# Patient Record
Sex: MALE | URBAN - METROPOLITAN AREA
[De-identification: names, ages, dates, MRNs, and addresses within clinical notes are randomized per-mention and may not be internally consistent; named-entity substitution may affect disease eponyms.]

---

## 2018-12-27 ENCOUNTER — OFFICE VISIT (OUTPATIENT)
Dept: ORTHOPEDIC SURGERY | Age: 28
End: 2018-12-27

## 2018-12-27 DIAGNOSIS — M25.512 LEFT SHOULDER PAIN, UNSPECIFIED CHRONICITY: Primary | ICD-10-CM

## 2018-12-27 DIAGNOSIS — M79.645 FINGER PAIN, LEFT: ICD-10-CM

## 2018-12-27 DIAGNOSIS — R10.2 PELVIC PAIN: ICD-10-CM

## 2018-12-27 PROCEDURE — 99999 PR OFFICE/OUTPT VISIT,PROCEDURE ONLY: CPT | Performed by: ORTHOPAEDIC SURGERY

## 2022-10-25 ENCOUNTER — TELEPHONE (OUTPATIENT)
Dept: ORTHOPEDICS | Facility: CLINIC | Age: 32
End: 2022-10-25
Payer: COMMERCIAL

## 2022-10-25 NOTE — TELEPHONE ENCOUNTER
Called patient to get him scheduled with Dr. Aayush Amos. Need to confirm injured body part. Left voicemail for call back.       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 He tore a ligament about a month ago, but finished out the season. He is now back in town and needs and to have his torn ligament fixed. It is being paid for through the team insurance. Can you please assist pt with scheduling if he is able to be seen?

## 2022-10-26 ENCOUNTER — TELEPHONE (OUTPATIENT)
Dept: ORTHOPEDICS | Facility: CLINIC | Age: 32
End: 2022-10-26
Payer: COMMERCIAL

## 2022-10-26 DIAGNOSIS — M25.572 LEFT ANKLE PAIN, UNSPECIFIED CHRONICITY: Primary | ICD-10-CM

## 2022-10-26 NOTE — TELEPHONE ENCOUNTER
Patient is scheduled in November with Dr. Amos for his left ankle injury while playing football.    ----- Message from Jocelynn Cohen sent at 10/26/2022 11:14 AM CDT -----  Contact: Patient  Patient needs a call back at [phone], Regards to a missed a call for an appointment access.    Thanks  Td

## 2022-11-09 ENCOUNTER — OFFICE VISIT (OUTPATIENT)
Dept: ORTHOPEDICS | Facility: CLINIC | Age: 32
End: 2022-11-09
Payer: COMMERCIAL

## 2022-11-09 ENCOUNTER — HOSPITAL ENCOUNTER (OUTPATIENT)
Dept: RADIOLOGY | Facility: HOSPITAL | Age: 32
Discharge: HOME | End: 2022-11-09
Attending: ORTHOPAEDIC SURGERY
Payer: COMMERCIAL

## 2022-11-09 VITALS — BODY MASS INDEX: 27.98 KG/M2 | HEIGHT: 75 IN | WEIGHT: 225 LBS

## 2022-11-09 DIAGNOSIS — M25.572 LEFT ANKLE PAIN, UNSPECIFIED CHRONICITY: ICD-10-CM

## 2022-11-09 DIAGNOSIS — M25.871 ANKLE IMPINGEMENT SYNDROME, RIGHT: ICD-10-CM

## 2022-11-09 DIAGNOSIS — S93.331A SUBLUXATION OF PERONEAL TENDON OF RIGHT FOOT: Primary | ICD-10-CM

## 2022-11-09 PROCEDURE — 99203 PR OFFICE/OUTPT VISIT, NEW, LEVL III, 30-44 MIN: ICD-10-PCS | Mod: S$GLB,,, | Performed by: ORTHOPAEDIC SURGERY

## 2022-11-09 PROCEDURE — 73610 X-RAY EXAM OF ANKLE: CPT | Mod: 26,RT,, | Performed by: RADIOLOGY

## 2022-11-09 PROCEDURE — 73610 X-RAY EXAM OF ANKLE: CPT | Mod: TC,RT

## 2022-11-09 PROCEDURE — 73610 XR ANKLE COMPLETE 3 VIEW RIGHT: ICD-10-PCS | Mod: 26,RT,, | Performed by: RADIOLOGY

## 2022-11-09 PROCEDURE — 99203 OFFICE O/P NEW LOW 30 MIN: CPT | Mod: S$GLB,,, | Performed by: ORTHOPAEDIC SURGERY

## 2022-11-09 PROCEDURE — 99999 PR PBB SHADOW E&M-EST. PATIENT-LVL III: ICD-10-PCS | Mod: PBBFAC,,, | Performed by: ORTHOPAEDIC SURGERY

## 2022-11-09 PROCEDURE — 99999 PR PBB SHADOW E&M-EST. PATIENT-LVL III: CPT | Mod: PBBFAC,,, | Performed by: ORTHOPAEDIC SURGERY

## 2022-11-09 NOTE — PROGRESS NOTES
Patient ID: Taras Bach  YOB: 1990  MRN: [id number]    Chief Complaint: Pain of the Right Ankle    Referred By: Teammates    History of Present Illness: Taras Bach is a  32 y.o. male - Wide Reciever with a chief complaint of Pain of the Right Ankle    Taras is here today with complaint of left ankle. He rates his pain as a 3/10 today without any pain medication. His injury occurred on 8/19/22 when he rolled his ankle outward while jumping off of it and when he went to land another player landed on his ankle while it rolled inward. He reports popping while walking. He reports swelling, weakness, slight limited ROM, but denies any numbness or tingling. He also states his ankle does not feel unstable. He has been seen prior and had an MRI that showed an older fx and torn ligaments and tendons. He did PT and a bone stim for about 3 months without much pain relief. He has not had any surgeries for his ankle, though. His pain is worsened by quickly changing direction and when he runs. His pain is alleviated by rest.      Past Medical History:   History reviewed. No pertinent past medical history.  Past Surgical History:   Procedure Laterality Date    HAND SURGERY      HERNIA REPAIR       Family History   Problem Relation Age of Onset    Cancer Father      Social History     Socioeconomic History    Marital status: Single   Tobacco Use    Smoking status: Never    Smokeless tobacco: Never   Substance and Sexual Activity    Alcohol use: Yes    Drug use: Not Currently     Types: Marijuana       Review of patient's allergies indicates:   Allergen Reactions    Amoxicillin (bulk)     Penicillins      ROS    Physical Exam:   Body mass index is 28.12 kg/m².  There were no vitals filed for this visit.   GENERAL: Well appearing, appropriate for stated age, no acute distress.  CARDIOVASCULAR: Pulses regular by peripheral palpation.  PULMONARY: Respirations are even and  "non-labored.  NEURO: Awake, alert, and oriented x 3.  PSYCH: Mood & affect are appropriate.  HEENT: Head is normocephalic and atraumatic.    Right Ankle:  Inspection: No effusion, erythema, or ecchymosis   Palpation tenderness: Tender anterior joint line, CFL, peroneal tendons     Palpation during circumduction reproduces "snapping" subluxation  Range of motion:  10 deg DF - decreased compared to contralateral ankle     50 deg PF       35 Inversion       15 Eversion  Strength:  5/5 DF    5/5 PF    5/5 Inversion    5-/5 Eversion  N/V Exam:  Tibial:    Normal sensory (plantar foot)  Normal motor (FHL)    Sup Peroneal:   Normal sensory (dorsal foot)  Normal motor (Peroneals)            Deep Peroneal:   Normal sensory (1st web space)  Normal motor (EHL)    Sural:   Normal sensory (lateral foot)   Saphenous:   Normal sensory (medial lower leg)   Normal pedal pulses, warm and well perfused with capillary refill < 2 sec   Calf soft and compressible    Imaging:    X-Ray Ankle Complete 3 View Right  Narrative: EXAMINATION:  XR ANKLE COMPLETE 3 VIEW RIGHT    CLINICAL HISTORY:  Pain in left ankle and joints of left foot    TECHNIQUE:  AP, lateral, and oblique images of the right ankle were performed.    COMPARISON:  None    FINDINGS:  Moderate osteoarthritis of the tibiotalar joint without evidence of acute fracture or dislocation.  The ankle mortise is grossly intact.  On the gravity stress view the medial clear space is slightly widened compared to the lateral clear space, however obliquity slightly limits evaluation.  Impression: Osteoarthritis of the ankle without acute fracture or dislocation.    Mild asymmetry in the clear spaces on stress view, however obliquity limits evaluation.    Electronically signed by: Alfonzo Navarrete  Date:    11/09/2022  Time:    10:04      Relevant imaging results reviewed and interpreted by me, discussed with the patient and / or family today.     Other Tests:         Patient Instructions "   Assessment:  Taras Bach is a 32 y.o. male   - Wide Reciever with a chief complaint of Pain of the Right Ankle    Right ankle injury August 2022    Encounter Diagnoses   Name Primary?    Subluxation of peroneal tendon of right foot Yes    Ankle impingement syndrome, right       Plan:  The patient's history, clinical exam, and imaging findings are consistent with peroneal tendon injury with subluxation and ankle joint impingement.  We require a new MRI to evaluate this more closely and guide surgery decisions.  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    Follow-up: after MRI or sooner if there are any problems between now and then.    Leave Review:   Google: Leave Google Review  Healthgrades: Leave Healthgrades Review    After Hours Number: [phone]      Provider Note/Medical Decision Making:       I discussed worrisome and red flag signs and symptoms with the patient. The patient expressed understanding and agreed to alert me immediately or to go to the emergency room if they experience any of these.   Treatment plan was developed with input from the patient/family, and they expressed understanding and agreement with the plan. All questions were answered today.          Aayush Amos MD  Orthopaedic Surgery & Sports Medicine       Disclaimer: This note was prepared using a voice recognition system and is likely to have sound alike errors within the text.

## 2022-11-09 NOTE — PATIENT INSTRUCTIONS
Assessment:  Taras Bach is a 32 y.o. male   - Wide Reciever with a chief complaint of Pain of the Right Ankle    Right ankle injury August 2022    Encounter Diagnoses   Name Primary?    Subluxation of peroneal tendon of right foot Yes    Ankle impingement syndrome, right       Plan:  The patient's history, clinical exam, and imaging findings are consistent with peroneal tendon injury with subluxation and ankle joint impingement.  We require a new MRI to evaluate this more closely and guide surgery decisions.  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    Follow-up: after MRI or sooner if there are any problems between now and then.    Leave Review:   Google: Leave Google Review  Healthgrades: Leave Healthgrades Review    After Hours Number: [phone]

## 2022-11-09 NOTE — PROGRESS NOTES
Patient ID: Taras Bach  YOB: 1990  MRN: [id number]    Chief Complaint: Pain, Injury, and Swelling of the Left Ankle      Referred By: Teammates    History of Present Illness: Taras Bach is a  32 y.o. male   - Wide Reciever with a chief complaint of Pain, Injury, and Swelling of the Left Ankle    Taras is here today with complaint of left ankle. He rates his pain as a 3/10 today without any pain medication. His injury occurred on 8/19/22 when he rolled his ankle outward while jumping off of it and when he went to land another player landed on his ankle while it rolled inward. He reports popping while walking. He reports swelling, weakness, slight limited ROM, but denies any numbness or tingling. He also states his ankle does not feel unstable. He has been seen prior and had an MRI that showed an older fx and torn ligaments and tendons. He did PT and a bone stim for about 3 months without much pain relief. He has not had any surgeries for his ankle, though. His pain is worsened by quickly changing direction and when he runs. His pain is alleviated by rest.    HPI    Past Medical History:   History reviewed. No pertinent past medical history.  Past Surgical History:   Procedure Laterality Date    HAND SURGERY      HERNIA REPAIR       Family History   Problem Relation Age of Onset    Cancer Father      Social History     Socioeconomic History    Marital status: Single   Tobacco Use    Smoking status: Never    Smokeless tobacco: Never   Substance and Sexual Activity    Alcohol use: Yes    Drug use: Not Currently     Types: Marijuana       Review of patient's allergies indicates:   Allergen Reactions    Amoxicillin (bulk)     Penicillins      ROS    Physical Exam:   Body mass index is 28.12 kg/m².  There were no vitals filed for this visit.   GENERAL: Well appearing, appropriate for stated age, no acute distress.  CARDIOVASCULAR: Pulses regular by peripheral  palpation.  PULMONARY: Respirations are even and non-labored.  NEURO: Awake, alert, and oriented x 3.  PSYCH: Mood & affect are appropriate.  HEENT: Head is normocephalic and atraumatic.  Ortho/SPM Exam  ***    Imaging:    X-Ray Ankle Complete 3 View Right  Narrative: EXAMINATION:  XR ANKLE COMPLETE 3 VIEW RIGHT    CLINICAL HISTORY:  Pain in left ankle and joints of left foot    TECHNIQUE:  AP, lateral, and oblique images of the right ankle were performed.    COMPARISON:  None    FINDINGS:  Moderate osteoarthritis of the tibiotalar joint without evidence of acute fracture or dislocation.  The ankle mortise is grossly intact.  On the gravity stress view the medial clear space is slightly widened compared to the lateral clear space, however obliquity slightly limits evaluation.  Impression: Osteoarthritis of the ankle without acute fracture or dislocation.    Mild asymmetry in the clear spaces on stress view, however obliquity limits evaluation.    Electronically signed by: Alfonzo Navarrete  Date:    11/09/2022  Time:    10:04    ***  Relevant imaging results reviewed and interpreted by me, discussed with the patient and / or family today. ***    Other Tests:     ***    There are no Patient Instructions on file for this visit.  Provider Note/Medical Decision Making: ***      I discussed worrisome and red flag signs and symptoms with the patient. The patient expressed understanding and agreed to alert me immediately or to go to the emergency room if they experience any of these.   Treatment plan was developed with input from the patient/family, and they expressed understanding and agreement with the plan. All questions were answered today.          Aayush Amos MD  Orthopaedic Surgery & Sports Medicine       Disclaimer: This note was prepared using a voice recognition system and is likely to have sound alike errors within the text.

## 2022-11-17 ENCOUNTER — TELEPHONE (OUTPATIENT)
Dept: ORTHOPEDICS | Facility: CLINIC | Age: 32
End: 2022-11-17
Payer: COMMERCIAL

## 2022-11-17 NOTE — TELEPHONE ENCOUNTER
Called pt to inform him his MRI is denied due to insurance not having MRIs as a covered benefit. Suggested he speak with his insurance connections through the [employer] and see if there was an error of if they need anything from us to approve it.   He stated that he was told by his [place] doctor that the MRI was clear and easy to see he needed surgery, to which I explained the copy we were provided was entirely blurry and unreadable. He stated he could send a new copy and I advised that it could take weeks to receive from caitlyn.   He stated he will go forward with the MRI whether insurance covers it or not and is willing to pay out of pocket because he just wants to get his surgery scheduled as soon as possible to return to play sooner.   I provided estimates and pricing's phone number and told him to reach out if he has any questions

## 2022-11-18 ENCOUNTER — HOSPITAL ENCOUNTER (OUTPATIENT)
Dept: RADIOLOGY | Facility: HOSPITAL | Age: 32
Discharge: HOME | End: 2022-11-18
Attending: ORTHOPAEDIC SURGERY
Payer: COMMERCIAL

## 2022-11-18 DIAGNOSIS — S93.331A SUBLUXATION OF PERONEAL TENDON OF RIGHT FOOT: ICD-10-CM

## 2022-11-18 DIAGNOSIS — M25.871 ANKLE IMPINGEMENT SYNDROME, RIGHT: ICD-10-CM

## 2022-11-18 PROCEDURE — 73721 MRI JNT OF LWR EXTRE W/O DYE: CPT | Mod: TC,PN,RT

## 2022-11-28 ENCOUNTER — HOSPITAL ENCOUNTER (OUTPATIENT)
Dept: RADIOLOGY | Facility: HOSPITAL | Age: 32
Discharge: HOME | End: 2022-11-28
Attending: ORTHOPAEDIC SURGERY
Payer: COMMERCIAL

## 2022-11-28 ENCOUNTER — OFFICE VISIT (OUTPATIENT)
Dept: ORTHOPEDICS | Facility: CLINIC | Age: 32
End: 2022-11-28
Payer: COMMERCIAL

## 2022-11-28 VITALS — WEIGHT: 225 LBS | HEIGHT: 75 IN | BODY MASS INDEX: 27.98 KG/M2

## 2022-11-28 DIAGNOSIS — S93.331A SUBLUXATION OF PERONEAL TENDON OF RIGHT FOOT: Primary | ICD-10-CM

## 2022-11-28 DIAGNOSIS — S93.331A SUBLUXATION OF PERONEAL TENDON OF RIGHT FOOT: ICD-10-CM

## 2022-11-28 DIAGNOSIS — S93.431S SYNDESMOTIC DISRUPTION OF RIGHT ANKLE, SEQUELA: ICD-10-CM

## 2022-11-28 DIAGNOSIS — M25.871 ANKLE IMPINGEMENT SYNDROME, RIGHT: ICD-10-CM

## 2022-11-28 DIAGNOSIS — S93.421S TEAR OF DELTOID LIGAMENT OF RIGHT ANKLE, SEQUELA: ICD-10-CM

## 2022-11-28 PROCEDURE — 73610 X-RAY EXAM OF ANKLE: CPT | Mod: 26,RT,, | Performed by: RADIOLOGY

## 2022-11-28 PROCEDURE — 77071 PR  X-RAY STRESS VIEW: ICD-10-PCS | Mod: RT,S$GLB,, | Performed by: ORTHOPAEDIC SURGERY

## 2022-11-28 PROCEDURE — 73610 X-RAY EXAM OF ANKLE: CPT | Mod: TC,RT

## 2022-11-28 PROCEDURE — 73610 XR ANKLE COMPLETE 3 VIEW RIGHT: ICD-10-PCS | Mod: 26,RT,, | Performed by: RADIOLOGY

## 2022-11-28 PROCEDURE — 99214 PR OFFICE/OUTPT VISIT, EST, LEVL IV, 30-39 MIN: ICD-10-PCS | Mod: S$GLB,,, | Performed by: ORTHOPAEDIC SURGERY

## 2022-11-28 PROCEDURE — 99214 OFFICE O/P EST MOD 30 MIN: CPT | Mod: S$GLB,,, | Performed by: ORTHOPAEDIC SURGERY

## 2022-11-28 PROCEDURE — 99999 PR PBB SHADOW E&M-EST. PATIENT-LVL III: CPT | Mod: PBBFAC,,, | Performed by: ORTHOPAEDIC SURGERY

## 2022-11-28 PROCEDURE — 77071 MNL APPL STRS JT RADIOGRAPHY: CPT | Mod: RT,S$GLB,, | Performed by: ORTHOPAEDIC SURGERY

## 2022-11-28 PROCEDURE — 99999 PR PBB SHADOW E&M-EST. PATIENT-LVL III: ICD-10-PCS | Mod: PBBFAC,,, | Performed by: ORTHOPAEDIC SURGERY

## 2022-11-28 NOTE — PROGRESS NOTES
Patient ID: Taras Bach  YOB: 1990  MRN: [id number]    Chief Complaint: Pain and Injury of the Right Ankle      Referred By: Teammates    History of Present Illness: Taras Bach is a 32 y.o. male   - Wide Reciever with a chief complaint of Pain and Injury of the Right Ankle    Patient presents to the clinic today to review the results of his Right Ankle MRI. He c/o 6/10 pain on the Medial and Lateral aspect of his Ankle. Recall that his injury occurred on 8/19/22 when he rolled his ankle outward while jumping off of it and when he went to land another player landed on his ankle while it rolled inward. He c/o pain with weight bearing and prolonged standing. He states that he is out of sports and is not currently in PT. He ambulates FWB w/o assistance.     Recall from his previous visit on 11/9/2022:  Taras is here today with complaint of left ankle. He rates his pain as a 3/10 today without any pain medication. His injury occurred on 8/19/22 when he rolled his ankle outward while jumping off of it and when he went to land another player landed on his ankle while it rolled inward. He reports popping while walking. He reports swelling, weakness, slight limited ROM, but denies any numbness or tingling. He also states his ankle does not feel unstable. He has been seen prior and had an MRI that showed an older fx and torn ligaments and tendons. He did PT and a bone stim for about 3 months without much pain relief. He has not had any surgeries for his ankle, though. His pain is worsened by quickly changing direction and when he runs. His pain is alleviated by rest.    HPI    Past Medical History:   No past medical history on file.  Past Surgical History:   Procedure Laterality Date    HAND SURGERY      HERNIA REPAIR       Family History   Problem Relation Age of Onset    Cancer Father      Social History     Socioeconomic History    Marital status: Single   Tobacco  Use    Smoking status: Never    Smokeless tobacco: Never   Substance and Sexual Activity    Alcohol use: Yes    Drug use: Not Currently     Types: Marijuana       Review of patient's allergies indicates:   Allergen Reactions    Amoxicillin (bulk)     Penicillins      ROS    Physical Exam:   There is no height or weight on file to calculate BMI.  There were no vitals filed for this visit.   GENERAL: Well appearing, appropriate for stated age, no acute distress.  CARDIOVASCULAR: Pulses regular by peripheral palpation.  PULMONARY: Respirations are even and non-labored.  NEURO: Awake, alert, and oriented x 3.  PSYCH: Mood & affect are appropriate.  HEENT: Head is normocephalic and atraumatic.  Ortho/SPM Exam  ***    Imaging:    MRI Ankle Without Contrast Right  Narrative: EXAMINATION:  MRI ANKLE WITHOUT CONTRAST RIGHT    CLINICAL HISTORY:  Ankle trauma, continued pain > 1wk, neg xray (Age >= 5y);  Other subluxation of right foot, initial encounter    TECHNIQUE:  Multiplanar/multisequence MRI of the right ankle was performed without the use of intravenous or intra-articular gadolinium.    COMPARISON:  Right ankle radiograph on 11/09/2022    FINDINGS:  Ligaments: Complete ATFL and deltoid ligament tears.  Fluid and multiple tiny ossicles are demonstrated in anterolateral recess.  There is a small marginal osteophyte at the anterior tibial plafond and.  High-grade partial-thickness anterior inferior tibiotalar ligament tear with fluid signal at the inferior tibiofibular syndesmosis.  The posteroinferior tibiofibular ligament appears intact.    Indistinct irregular appearance of the PTFL and attenuation of the calcaneofibular ligament consistent with grade 2 sprains.    Tendons: The medial flexor tendons are within normal limits.  There is fluid seen in the flexor hallucis longus tendon sheath.  Longitudinal split tear through the peroneal longus tendon at the level of the lateral malleolus.  There is a concave contour of  the posterior aspect of lateral malleolus.  No evidence of peroneal subluxation.  Fluid in the peroneal tendon sheath.  The peroneal brevis tendon is intact.    Fluid in the extensor digitorum tendon sheath consistent with tenosynovitis.  No evidence of extensor tendon tear.    Joints: Small tibiotalar joint effusion.  The talar dome is within normal limits.  No evidence of tibiotalar chondral defect.    Bones:  Patchy marrow edema in the medial malleolus.  No cortical fracture lines.  There is a prominent bony protuberance arising from the anterior process of the calcaneus projecting into the sinus tarsi with there is an 8 mm ossicle present.  These findings may be sequela of previous fracture.    Miscellaneous: The plantar fascia and tarsal tunnel are within normal limits.  Prominent os trigonum surrounded by fluid.  No marrow edema identified within the accessory ossicle.  Impression: Complete ATFL and deltoid ligament tears.    High-grade partial-thickness anterior inferior tibiofibular ligament tear with probable tear of the inferior tibiofibular syndesmosis.    Grade 2 PTFL and calcaneofibular ligament sprains.    Longitudinally split tear through the peroneal longus tendon with associated tenosynovitis.    Extensor digitorum tenosynovitis.    Electronically signed by: Sudhir Jason MD  Date:    11/18/2022  Time:    14:02    ***  Relevant imaging results reviewed and interpreted by me, discussed with the patient and / or family today. ***    Other Tests:     ***    There are no Patient Instructions on file for this visit.  Provider Note/Medical Decision Making: ***      I discussed worrisome and red flag signs and symptoms with the patient. The patient expressed understanding and agreed to alert me immediately or to go to the emergency room if they experience any of these.   Treatment plan was developed with input from the patient/family, and they expressed understanding and agreement with the plan. All  questions were answered today.          Aayush Amos MD  Orthopaedic Surgery & Sports Medicine       Disclaimer: This note was prepared using a voice recognition system and is likely to have sound alike errors within the text.

## 2022-11-28 NOTE — PROGRESS NOTES
Patient ID: Taras Bach  YOB: 1990  MRN: [id number]    Chief Complaint: Pain and Injury of the Right Ankle      Referred By: Teammates    History of Present Illness: Taras Bach is a 32 y.o. male   - Wide Reciever with a chief complaint of Pain and Injury of the Right Ankle    Patient presents to the clinic today to review the results of his Right Ankle MRI. He c/o 6/10 pain on the Medial and Lateral aspect of his Ankle. Recall that his injury occurred on 8/19/22 when he rolled his ankle outward while jumping off of it and when he went to land another player landed on his ankle while it rolled inward. He c/o pain with weight bearing and prolonged standing. He states that he is out of sports and is not currently in PT. He ambulates FWB w/o assistance.     Recall from his previous visit on 11/9/2022:  Taras is here today with complaint of left ankle. He rates his pain as a 3/10 today without any pain medication. His injury occurred on 8/19/22 when he rolled his ankle outward while jumping off of it and when he went to land another player landed on his ankle while it rolled inward. He reports popping while walking. He reports swelling, weakness, slight limited ROM, but denies any numbness or tingling. He also states his ankle does not feel unstable. He has been seen prior and had an MRI that showed an older fx and torn ligaments and tendons. He did PT and a bone stim for about 3 months without much pain relief. He has not had any surgeries for his ankle, though. His pain is worsened by quickly changing direction and when he runs. His pain is alleviated by rest.    HPI    Past Medical History:   History reviewed. No pertinent past medical history.  Past Surgical History:   Procedure Laterality Date    HAND SURGERY      HERNIA REPAIR       Family History   Problem Relation Age of Onset    Cancer Father      Social History     Socioeconomic History    Marital  status: Single   Tobacco Use    Smoking status: Never    Smokeless tobacco: Never   Substance and Sexual Activity    Alcohol use: Yes    Drug use: Not Currently     Types: Marijuana       Review of patient's allergies indicates:   Allergen Reactions    Amoxicillin (bulk)     Penicillins      ROS    Physical Exam:   Body mass index is 28.12 kg/m².  There were no vitals filed for this visit.   GENERAL: Well appearing, appropriate for stated age, no acute distress.  CARDIOVASCULAR: Pulses regular by peripheral palpation.  PULMONARY: Respirations are even and non-labored.  NEURO: Awake, alert, and oriented x 3.  PSYCH: Mood & affect are appropriate.  HEENT: Head is normocephalic and atraumatic.  Ortho/SPM Exam  Right ankle  Ttp peroneals w subluxation/popping  Swelling anterolateral  Pain anterolaterally and medial with palp  Intact EHL, FHL, gastrocsoleus, and tibialis anterior. Sensation intact to light touch in superficial peroneal, deep peroneal, tibial, sural, and saphenous nerve distributions. Foot warm and well perfused with capillary refill of less than 2 seconds and palpable pedal pulses.      Imaging:    X-Ray Ankle Complete 3 View Right  Narrative: EXAMINATION:  XR ANKLE COMPLETE 3 VIEW RIGHT    CLINICAL HISTORY:  Other subluxation of right foot, initial encounter    TECHNIQUE:  AP, lateral, and oblique images of the right ankle were performed.    COMPARISON:  11/18/2022    FINDINGS:  No fracture dislocation.  The alignment and joint spaces are normal.  No significant soft tissue swelling.  Impression: No fracture or dislocation.    Electronically signed by: Christian Borjas MD  Date:    11/28/2022  Time:    11:53    MRI Ankle Without Contrast Right  Order: [id number]  Status: Final result     Visible to patient: No (seen, inaccessible in Patient Portal)     Next appt: None     Dx: Ankle impingement syndrome, right; Evans...     0 Result Notes  Details    Reading Physician Reading Date Result Priority   Sudhirwade Jason  MD  [phone] 11/18/2022 Routine     Narrative & Impression  EXAMINATION:  MRI ANKLE WITHOUT CONTRAST RIGHT     CLINICAL HISTORY:  Ankle trauma, continued pain > 1wk, neg xray (Age >= 5y);  Other subluxation of right foot, initial encounter     TECHNIQUE:  Multiplanar/multisequence MRI of the right ankle was performed without the use of intravenous or intra-articular gadolinium.     COMPARISON:  Right ankle radiograph on 11/09/2022     FINDINGS:  Ligaments: Complete ATFL and deltoid ligament tears.  Fluid and multiple tiny ossicles are demonstrated in anterolateral recess.  There is a small marginal osteophyte at the anterior tibial plafond and.  High-grade partial-thickness anterior inferior tibiotalar ligament tear with fluid signal at the inferior tibiofibular syndesmosis.  The posteroinferior tibiofibular ligament appears intact.     Indistinct irregular appearance of the PTFL and attenuation of the calcaneofibular ligament consistent with grade 2 sprains.     Tendons: The medial flexor tendons are within normal limits.  There is fluid seen in the flexor hallucis longus tendon sheath.  Longitudinal split tear through the peroneal longus tendon at the level of the lateral malleolus.  There is a concave contour of the posterior aspect of lateral malleolus.  No evidence of peroneal subluxation.  Fluid in the peroneal tendon sheath.  The peroneal brevis tendon is intact.     Fluid in the extensor digitorum tendon sheath consistent with tenosynovitis.  No evidence of extensor tendon tear.     Joints: Small tibiotalar joint effusion.  The talar dome is within normal limits.  No evidence of tibiotalar chondral defect.     Bones:  Patchy marrow edema in the medial malleolus.  No cortical fracture lines.  There is a prominent bony protuberance arising from the anterior process of the calcaneus projecting into the sinus tarsi with there is an 8 mm ossicle present.  These findings may be sequela of previous fracture.      Miscellaneous: The plantar fascia and tarsal tunnel are within normal limits.  Prominent os trigonum surrounded by fluid.  No marrow edema identified within the accessory ossicle.     Impression:     Complete ATFL and deltoid ligament tears.     High-grade partial-thickness anterior inferior tibiofibular ligament tear with probable tear of the inferior tibiofibular syndesmosis.     Grade 2 PTFL and calcaneofibular ligament sprains.     Longitudinally split tear through the peroneal longus tendon with associated tenosynovitis.     Extensor digitorum tenosynovitis.        Electronically signed by: Sudhri Jason MD  Date:                                            11/18/2022  Time:                                           14:02     Relevant imaging results reviewed and interpreted by me, discussed with the patient and / or family today. I personally performed a manual stress examination an x-ray today.  I used lead gloves and a lead apron and we obtained a regular mortise view and then mortise view with lateral external rotation stress.    Other Tests:         Patient Instructions   Assessment:  Taras Bach is a 32 y.o. male   - Wide Reciever with a chief complaint of Pain and Injury of the Right Ankle    Right ankle injury August 2022    Encounter Diagnoses   Name Primary?    Subluxation of peroneal tendon of right foot Yes    Ankle impingement syndrome, right     Syndesmotic disruption of right ankle, sequela     Tear of deltoid ligament of right ankle, sequela       Plan:  Referral to Dr. Ruma Rodriguez  at [facility] for 2nd opinion and surgical consult, possible joint surgery  We have discussed his football season and timeline. He would like to return to football by next April-June 2023.    Follow-up: TBD after consult with Dr. Rodriguez if there are any problems between now and then.    Leave Review:   Google: Leave Google Review  Healthgrades: Leave Healthgrades Review    After Hours  Number: [phone]      Provider Note/Medical Decision Making:  I personally performed a manual stress examination an x-ray today.  I used lead gloves and a lead apron and we obtained a regular mortise view and then mortise view with lateral external rotation stress.  He had this injury in September.  He continues to have significant functional limitations.  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  He is mostly symptomatic laterally and posterolaterally.  He did not open up significantly with a manual stress today but he does have evidence of syndesmosis injury on MRI and deltoid injury as well.  He has been consistently symptomatic.  We are going to refer him to Dr. Ruma Grande who is a foot and ankle and sports medicine specialist for collaboration on the best course of action for this patient      I discussed worrisome and red flag signs and symptoms with the patient. The patient expressed understanding and agreed to alert me immediately or to go to the emergency room if they experience any of these.   Treatment plan was developed with input from the patient/family, and they expressed understanding and agreement with the plan. All questions were answered today.          Aayush Amos MD  Orthopaedic Surgery & Sports Medicine       Disclaimer: This note was prepared using a voice recognition system and is likely to have sound alike errors within the text.

## 2022-11-28 NOTE — PATIENT INSTRUCTIONS
Assessment:  Taras Bach is a 32 y.o. male   - Wide Reciever with a chief complaint of Pain and Injury of the Right Ankle    Right ankle injury August 2022    Encounter Diagnoses   Name Primary?    Subluxation of peroneal tendon of right foot Yes    Ankle impingement syndrome, right     Syndesmotic disruption of right ankle, sequela     Tear of deltoid ligament of right ankle, sequela       Plan:  Referral to Dr. Ruma Rodriguez  at [facility] for 2nd opinion and surgical consult, possible joint surgery  We have discussed his football season and timeline. He would like to return to football by next April-June 2023.    Follow-up: TBD after consult with Dr. Rodriguez if there are any problems between now and then.    Leave Review:   Google: Leave Google Review  Healthgrades: Leave Healthgrades Review    After Hours Number: [phone]

## 2022-12-02 ENCOUNTER — TELEPHONE (OUTPATIENT)
Dept: PREADMISSION TESTING | Facility: HOSPITAL | Age: 32
End: 2022-12-02
Payer: COMMERCIAL

## 2022-12-02 DIAGNOSIS — Z01.818 PRE-OP TESTING: Primary | ICD-10-CM

## 2022-12-02 NOTE — TELEPHONE ENCOUNTER
Pre op instructions reviewed with patient per phone.      To confirm, your doctor has instructed you: Surgery is scheduled for 12/6/2022.     Pre admit office will call the afternoon prior to surgery between 1PM and 3PM with arrival time.    Surgery will be at [facility],  The address is [address] [address]. [place]  [id number].      IMPORTANT INSTRUCTIONS!    Do not eat or drink after 12 midnight, including water. Do not smoke or use chewing tobacco after 12 midnight  OK to brush teeth, but no gum, candy, or mints!      *Take only these medicines with a small swallow of water-morning of surgery*     none       ____ Stop Aspirin, Ibuprofen, Motrin and Aleve at least 5-7 days before surgery, unless otherwise instructed by your doctor, or the nurse.   You MAY use Tylenol/acetaminophen until day of surgery.      ____  If you take diabetic medication, do NOT take morning of surgery unless instructed by Doctor. Metformin must be stopped 24 hrs prior to surgery time.       ____ Stop taking any Fish Oil supplements or Vitamins at least 5 days prior to surgery, unless instructed otherwise by your Doctor.     Bathing Instructions: The night before surgery and the morning prior to coming to the hospital:    - Shower & rinse your body as usual with anti-bacterial Soap (Dial or Lever 2000)   -Hibiclens (if indicated) use AFTER anti-bacterial soap; 1 packet PM/1 packet in AM on surgical site only   -Do not use hibiclens on your head, face, or genitals.    -Do not wash with anti-bacterial soap after you use the hibiclens.    -Do not shave surgical site 5-7 days prior to surgery.    -Pubic hair 7 days prior to surgery (gyn pt's).      Pediatric patients do not need to use anti-bacterial soap or Hibiclens.             After Bathing:   __ No powder, lotions, creams, or body spray to skin     __No deodorant for any breast procedure, PORT, or upper arm surgery     __ No makeup, mascara, nail polish or artificial nails        **SURGERY WILL BE CANCELLED IF ARTIFICIAL/NAIL POLISH IS PRESENT!!!**    __ Please remove all piercings and leave all jewelry at home.    **SURGERY WILL BE CANCELLED IF PIERCINGS ARE PRESENT!!!**      __ Dentures, Hearing Aids and Contact Lens need to be removed prior to the start of surgery.      __ Wear clean, loose-fitting clothing. Allow for dressings/bandages/surgical equipment     __ You must have transportation, and they MUST stay the entire time.         [facility] Visitor/Ride Policy:   Only 1 adult allowed (over the age of 18) to accompany you into Pre-op/Recovery Surgery Dept and must stay through the entire length of admission.     Must have a ride home from a responsible adult that you know and trust.      Pediatric Patients are allowed 2 adult visitors.     Medical Transport, Uber or Lyft can only be used if patient has a responsible adult to accompany them during ride home.           Post-Op Instructions: You will receive surgery post-op instructions by your Discharge Nurse prior to going home.     Surgical Site Infection:   Prevention of surgical site infections:   -Keep incisions clean and dry.   -Do not soak/submerge incisions in water until completely healed.   -Do not apply lotions, powders, creams, or deodorants to site.   -Always make sure hands are cleaned with antibacterial soap/ alcohol-based   prior to touching the surgical site.       Signs and symptoms:               -Redness and pain around the area where you had surgery               -Drainage of cloudy fluid from your surgical wound               -Fever over 100.4 or chills     >>>Call Surgeon office/on-call Surgeon if you experience any of these signs & symptoms post-surgery.        *Please Call [facility] Pre-Admissions Department with surgery instruction questions at [phone].     *Insurance Questions, please call [phone] or [phone]    Spoke about pre op process and surgery instructions, verbalized understanding.

## 2022-12-05 ENCOUNTER — TELEPHONE (OUTPATIENT)
Dept: ORTHOPEDICS | Facility: CLINIC | Age: 32
End: 2022-12-05
Payer: COMMERCIAL

## 2022-12-05 ENCOUNTER — ANESTHESIA EVENT (OUTPATIENT)
Dept: SURGERY | Facility: HOSPITAL | Age: 32
End: 2022-12-05
Payer: COMMERCIAL

## 2022-12-05 ENCOUNTER — LAB VISIT (OUTPATIENT)
Dept: LAB | Facility: HOSPITAL | Age: 32
End: 2022-12-05
Payer: COMMERCIAL

## 2022-12-05 DIAGNOSIS — Z01.818 PRE-OP TESTING: ICD-10-CM

## 2022-12-05 LAB
ALBUMIN SERPL BCP-MCNC: 4.1 G/DL (ref 3.5–5.2)
ALP SERPL-CCNC: 101 U/L (ref 55–135)
ALT SERPL W/O P-5'-P-CCNC: 22 U/L (ref 10–44)
ANION GAP SERPL CALC-SCNC: 11 MMOL/L (ref 8–16)
AST SERPL-CCNC: 24 U/L (ref 10–40)
BASOPHILS # BLD AUTO: 0.05 K/UL (ref 0–0.2)
BASOPHILS NFR BLD: 1.2 % (ref 0–1.9)
BILIRUB SERPL-MCNC: 1.6 MG/DL (ref 0.1–1)
BUN SERPL-MCNC: 16 MG/DL (ref 6–20)
CALCIUM SERPL-MCNC: 9.3 MG/DL (ref 8.7–10.5)
CHLORIDE SERPL-SCNC: 105 MMOL/L (ref 95–110)
CO2 SERPL-SCNC: 25 MMOL/L (ref 23–29)
CREAT SERPL-MCNC: 1.2 MG/DL (ref 0.5–1.4)
DIFFERENTIAL METHOD: ABNORMAL
EOSINOPHIL # BLD AUTO: 0.1 K/UL (ref 0–0.5)
EOSINOPHIL NFR BLD: 1.5 % (ref 0–8)
ERYTHROCYTE [DISTWIDTH] IN BLOOD BY AUTOMATED COUNT: 11.3 % (ref 11.5–14.5)
EST. GFR  (NO RACE VARIABLE): >60 ML/MIN/1.73 M^2
GLUCOSE SERPL-MCNC: 95 MG/DL (ref 70–110)
HCT VFR BLD AUTO: 47 % (ref 40–54)
HGB BLD-MCNC: 15.6 G/DL (ref 14–18)
IMM GRANULOCYTES # BLD AUTO: 0.01 K/UL (ref 0–0.04)
IMM GRANULOCYTES NFR BLD AUTO: 0.2 % (ref 0–0.5)
LYMPHOCYTES # BLD AUTO: 1.8 K/UL (ref 1–4.8)
LYMPHOCYTES NFR BLD: 45.1 % (ref 18–48)
MCH RBC QN AUTO: 33.3 PG (ref 27–31)
MCHC RBC AUTO-ENTMCNC: 33.2 G/DL (ref 32–36)
MCV RBC AUTO: 100 FL (ref 82–98)
MONOCYTES # BLD AUTO: 0.3 K/UL (ref 0.3–1)
MONOCYTES NFR BLD: 8.4 % (ref 4–15)
NEUTROPHILS # BLD AUTO: 1.8 K/UL (ref 1.8–7.7)
NEUTROPHILS NFR BLD: 43.6 % (ref 38–73)
NRBC BLD-RTO: 0 /100 WBC
PLATELET # BLD AUTO: 152 K/UL (ref 150–450)
PMV BLD AUTO: 11.6 FL (ref 9.2–12.9)
POTASSIUM SERPL-SCNC: 4.1 MMOL/L (ref 3.5–5.1)
PROT SERPL-MCNC: 6.9 G/DL (ref 6–8.4)
RBC # BLD AUTO: 4.68 M/UL (ref 4.6–6.2)
SODIUM SERPL-SCNC: 141 MMOL/L (ref 136–145)
WBC # BLD AUTO: 4.06 K/UL (ref 3.9–12.7)

## 2022-12-05 PROCEDURE — 36415 COLL VENOUS BLD VENIPUNCTURE: CPT | Mod: PO | Performed by: PHYSICIAN ASSISTANT

## 2022-12-05 PROCEDURE — 80053 COMPREHEN METABOLIC PANEL: CPT | Performed by: PHYSICIAN ASSISTANT

## 2022-12-05 PROCEDURE — 85025 COMPLETE CBC W/AUTO DIFF WBC: CPT | Performed by: PHYSICIAN ASSISTANT

## 2022-12-05 NOTE — H&P
"[facility]  Orthopedics  H&P    Patient Name: Taras Bach  MRN: [id number]  Admission Date: 12/6/2022  Primary Care Provider: Primary Doctor No    Patient information was obtained from patient.     Subjective:     Principal Problem:<principal problem not specified>    Chief Complaint: No chief complaint on file.       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 He is looking to tryout for multiple [employer] teams in the spring. He was referred to Dr. Ruma Rodriguez by Dr. Amos for his right ankle pain/injury. His pain is 5/10 and both medial and lateral, lateral is more painful. He ha a history of multiple ankle sprains but since his injury on 8/19/2022, he is unable to continue playing. Cutting to the left is particularly painful. He has tried ice and PT but not experiencing any relief of his pain. He underwent a right ankle MRI on 11/18/2022 with findings listed below.     History reviewed. No pertinent past medical history.    Past Surgical History:   Procedure Laterality Date    HAND SURGERY      HERNIA REPAIR         Review of patient's allergies indicates:   Allergen Reactions    Amoxicillin (bulk)     Penicillins        No current facility-administered medications for this encounter.     No current outpatient medications on file.     Family History       Problem Relation (Age of Onset)    Cancer Father          Tobacco Use    Smoking status: Never    Smokeless tobacco: Never   Substance and Sexual Activity    Alcohol use: Yes     Comment: social    Drug use: Not Currently     Types: Marijuana    Sexual activity: Not on file     ROS  Objective:     Vital Signs (Most Recent):    Vital Signs (24h Range):        Weight: 105.7 kg (233 lb)  Height: 6' 3" (190.5 cm)  Body mass index is 29.12 kg/m².    No intake or output data in the 24 hours ending 12/05/22 1544    Ortho/SPM Exam    Significant Labs: All " pertinent labs within the past 24 hours have been reviewed.    Significant Imaging: MRI: I have reviewed all pertinent results/findings and my personal findings are:  Complete ATFL and deltoid ligament tears, high-grade partial-thickness anterior inferior tibiofibular ligament tear with probable tear of the inferior tibiofibular syndesmosis,grade 2 PTFL and CFL sprains, longitudinally split tear through the peroneal longus tendon with associated tenosynovitis, extensor digitorum tenosynovitis.    Assessment/Plan:     There are no hospital problems to display for this patient.    - To OR for right ankle lateral ligament reconstruction, ORIF syndesmosis, extensive ankle arthroscopy, peroneal tendon repair, deltoid repair  - Outpatient procedure, d/c post op    Juhi Wilkinson PA-C  Orthopedics  [facility] - Periop Services

## 2022-12-05 NOTE — ANESTHESIA PREPROCEDURE EVALUATION
12/05/2022  Taras Bach is a 32 y.o., male.      Pre-op Assessment    I have reviewed the Patient Summary Reports.     I have reviewed the Nursing Notes. I have reviewed the NPO Status.   I have reviewed the Medications.     Review of Systems  Anesthesia Hx:  No problems with previous Anesthesia  Denies Family Hx of Anesthesia complications.   Denies Personal Hx of Anesthesia complications.   Social:  Social Alcohol Use    Hematology/Oncology:  Hematology Normal        Cardiovascular:  Cardiovascular Normal     Pulmonary:  Pulmonary Normal    Renal/:  Renal/ Normal     Hepatic/GI:  Hepatic/GI Normal    Neurological:  Neurology Normal    Psych:  Psychiatric Normal           Physical Exam  General: Alert and Oriented    Airway:  Mallampati: II   Mouth Opening: Normal  TM Distance: Normal  Tongue: Normal  Neck ROM: Normal ROM    Dental:  Intact    Chest/Lungs:  Clear to auscultation, Normal Respiratory Rate    Heart:  Rate: Normal  Rhythm: Regular Rhythm        Anesthesia Plan  Type of Anesthesia, risks & benefits discussed:    Anesthesia Type: Gen ETT  Intra-op Monitoring Plan: Standard ASA Monitors  Post Op Pain Control Plan: multimodal analgesia and IV/PO Opioids PRN  Induction:  IV  Informed Consent: Informed consent signed with the Patient and all parties understand the risks and agree with anesthesia plan.  All questions answered.   ASA Score: 1  Day of Surgery Review of History & Physical: H&P Update referred to the surgeon/provider.    Ready For Surgery From Anesthesia Perspective.     .

## 2022-12-05 NOTE — TELEPHONE ENCOUNTER
Called pt regarding surgery tomorrow. Was notified that his insurance had not been verified by pre-service. He stated that he was called earlier today and was in contact with his team and someone from [facility] who verified that his surgery was all set up for tomorrow. He said we could reach out to Dr. Rodriguez's team because they were already aware of his clearances.

## 2022-12-06 ENCOUNTER — HOSPITAL ENCOUNTER (OUTPATIENT)
Facility: HOSPITAL | Age: 32
Discharge: HOME | End: 2022-12-06
Attending: ORTHOPAEDIC SURGERY | Admitting: ORTHOPAEDIC SURGERY
Payer: COMMERCIAL

## 2022-12-06 ENCOUNTER — HOSPITAL ENCOUNTER (OUTPATIENT)
Dept: RADIOLOGY | Facility: HOSPITAL | Age: 32
Discharge: HOME | End: 2022-12-06
Attending: ORTHOPAEDIC SURGERY | Admitting: ORTHOPAEDIC SURGERY
Payer: COMMERCIAL

## 2022-12-06 ENCOUNTER — ANESTHESIA (OUTPATIENT)
Dept: SURGERY | Facility: HOSPITAL | Age: 32
End: 2022-12-06
Payer: COMMERCIAL

## 2022-12-06 VITALS
TEMPERATURE: 99 F | OXYGEN SATURATION: 96 % | HEIGHT: 75 IN | RESPIRATION RATE: 14 BRPM | BODY MASS INDEX: 29.07 KG/M2 | HEART RATE: 67 BPM | DIASTOLIC BLOOD PRESSURE: 70 MMHG | SYSTOLIC BLOOD PRESSURE: 137 MMHG | WEIGHT: 233.81 LBS

## 2022-12-06 DIAGNOSIS — M25.871 ANKLE IMPINGEMENT SYNDROME, RIGHT: ICD-10-CM

## 2022-12-06 DIAGNOSIS — S93.331A SUBLUXATION OF PERONEAL TENDON OF RIGHT FOOT: Primary | ICD-10-CM

## 2022-12-06 DIAGNOSIS — S93.409A ANKLE SPRAIN: ICD-10-CM

## 2022-12-06 PROCEDURE — 29898 PR ANKLE SCOPE,EXTENS DEBRIDEMNT: ICD-10-PCS | Mod: 80,51,RT, | Performed by: ORTHOPAEDIC SURGERY

## 2022-12-06 PROCEDURE — 71000015 HC POSTOP RECOV 1ST HR: Performed by: ORTHOPAEDIC SURGERY

## 2022-12-06 PROCEDURE — 29898 ANKLE ARTHROSCOPY/SURGERY: CPT | Mod: 80,51,RT, | Performed by: ORTHOPAEDIC SURGERY

## 2022-12-06 PROCEDURE — 27829 TREAT LOWER LEG JOINT: CPT | Mod: 80,51,RT, | Performed by: ORTHOPAEDIC SURGERY

## 2022-12-06 PROCEDURE — 63600175 PHARM REV CODE 636 W HCPCS: Performed by: NURSE ANESTHETIST, CERTIFIED REGISTERED

## 2022-12-06 PROCEDURE — 25000003 PHARM REV CODE 250: Performed by: NURSE ANESTHETIST, CERTIFIED REGISTERED

## 2022-12-06 PROCEDURE — 36000708 HC OR TIME LEV III 1ST 15 MIN: Performed by: ORTHOPAEDIC SURGERY

## 2022-12-06 PROCEDURE — 27658 REPAIR OF LEG TENDON EACH: CPT | Mod: 80,51,RT, | Performed by: ORTHOPAEDIC SURGERY

## 2022-12-06 PROCEDURE — 63600175 PHARM REV CODE 636 W HCPCS: Performed by: ANESTHESIOLOGY

## 2022-12-06 PROCEDURE — 27658 PR REPAIR FLEX LEG TENDON,PRIM,EA: ICD-10-PCS | Mod: 80,51,RT, | Performed by: ORTHOPAEDIC SURGERY

## 2022-12-06 PROCEDURE — 27698 REPAIR OF ANKLE LIGAMENT: CPT | Mod: 80,RT,, | Performed by: ORTHOPAEDIC SURGERY

## 2022-12-06 PROCEDURE — 37000009 HC ANESTHESIA EA ADD 15 MINS: Performed by: ORTHOPAEDIC SURGERY

## 2022-12-06 PROCEDURE — 27201423 OPTIME MED/SURG SUP & DEVICES STERILE SUPPLY: Performed by: ORTHOPAEDIC SURGERY

## 2022-12-06 PROCEDURE — 63600175 PHARM REV CODE 636 W HCPCS: Performed by: ORTHOPAEDIC SURGERY

## 2022-12-06 PROCEDURE — 27800903 OPTIME MED/SURG SUP & DEVICES OTHER IMPLANTS: Performed by: ORTHOPAEDIC SURGERY

## 2022-12-06 PROCEDURE — 36000709 HC OR TIME LEV III EA ADD 15 MIN: Performed by: ORTHOPAEDIC SURGERY

## 2022-12-06 PROCEDURE — D9220A PRA ANESTHESIA: ICD-10-PCS | Mod: ANES,,, | Performed by: ANESTHESIOLOGY

## 2022-12-06 PROCEDURE — D9220A PRA ANESTHESIA: Mod: CRNA,,, | Performed by: NURSE ANESTHETIST, CERTIFIED REGISTERED

## 2022-12-06 PROCEDURE — D9220A PRA ANESTHESIA: Mod: ANES,,, | Performed by: ANESTHESIOLOGY

## 2022-12-06 PROCEDURE — 27829 PR OPEN TX DISTAL TIBIOFIBULAR JOINT DISRUPTION: ICD-10-PCS | Mod: 80,51,RT, | Performed by: ORTHOPAEDIC SURGERY

## 2022-12-06 PROCEDURE — A4216 STERILE WATER/SALINE, 10 ML: HCPCS | Performed by: ORTHOPAEDIC SURGERY

## 2022-12-06 PROCEDURE — 27698 PR REPAIR COLLAT ANKLE LIGMNT,SECONDARY: ICD-10-PCS | Mod: 80,RT,, | Performed by: ORTHOPAEDIC SURGERY

## 2022-12-06 PROCEDURE — 73600 X-RAY EXAM OF ANKLE: CPT | Mod: TC,RT

## 2022-12-06 PROCEDURE — 25000003 PHARM REV CODE 250: Performed by: ORTHOPAEDIC SURGERY

## 2022-12-06 PROCEDURE — C1713 ANCHOR/SCREW BN/BN,TIS/BN: HCPCS | Performed by: ORTHOPAEDIC SURGERY

## 2022-12-06 PROCEDURE — 37000008 HC ANESTHESIA 1ST 15 MINUTES: Performed by: ORTHOPAEDIC SURGERY

## 2022-12-06 PROCEDURE — D9220A PRA ANESTHESIA: ICD-10-PCS | Mod: CRNA,,, | Performed by: NURSE ANESTHETIST, CERTIFIED REGISTERED

## 2022-12-06 PROCEDURE — 71000039 HC RECOVERY, EACH ADD'L HOUR: Performed by: ORTHOPAEDIC SURGERY

## 2022-12-06 PROCEDURE — 71000033 HC RECOVERY, INTIAL HOUR: Performed by: ORTHOPAEDIC SURGERY

## 2022-12-06 DEVICE — SCREW BONE NON LOCK 3.5X14MM: Type: IMPLANTABLE DEVICE | Site: ANKLE | Status: FUNCTIONAL

## 2022-12-06 DEVICE — IMPLANTABLE DEVICE: Type: IMPLANTABLE DEVICE | Site: ANKLE | Status: FUNCTIONAL

## 2022-12-06 RX ORDER — BUPIVACAINE HYDROCHLORIDE 2.5 MG/ML
INJECTION, SOLUTION EPIDURAL; INFILTRATION; INTRACAUDAL
Status: DISCONTINUED
Start: 2022-12-06 | End: 2022-12-06 | Stop reason: WASHOUT

## 2022-12-06 RX ORDER — MEPERIDINE HYDROCHLORIDE 25 MG/ML
12.5 INJECTION INTRAMUSCULAR; INTRAVENOUS; SUBCUTANEOUS ONCE
Status: DISCONTINUED | OUTPATIENT
Start: 2022-12-06 | End: 2022-12-06 | Stop reason: HOSPADM

## 2022-12-06 RX ORDER — KETOROLAC TROMETHAMINE 30 MG/ML
INJECTION, SOLUTION INTRAMUSCULAR; INTRAVENOUS
Status: DISCONTINUED | OUTPATIENT
Start: 2022-12-06 | End: 2022-12-06

## 2022-12-06 RX ORDER — EPINEPHRINE 1 MG/ML
INJECTION, SOLUTION, CONCENTRATE INTRAVENOUS
Status: DISPENSED
Start: 2022-12-06 | End: 2022-12-06

## 2022-12-06 RX ORDER — ROCURONIUM BROMIDE 10 MG/ML
INJECTION, SOLUTION INTRAVENOUS
Status: DISCONTINUED | OUTPATIENT
Start: 2022-12-06 | End: 2022-12-06

## 2022-12-06 RX ORDER — HYDROMORPHONE HYDROCHLORIDE 2 MG/ML
0.2 INJECTION, SOLUTION INTRAMUSCULAR; INTRAVENOUS; SUBCUTANEOUS EVERY 5 MIN PRN
Status: DISCONTINUED | OUTPATIENT
Start: 2022-12-06 | End: 2022-12-06 | Stop reason: HOSPADM

## 2022-12-06 RX ORDER — EPINEPHRINE 1 MG/ML
INJECTION, SOLUTION, CONCENTRATE INTRAVENOUS
Status: DISCONTINUED | OUTPATIENT
Start: 2022-12-06 | End: 2022-12-06 | Stop reason: HOSPADM

## 2022-12-06 RX ORDER — HYDROCODONE BITARTRATE AND ACETAMINOPHEN 5; 325 MG/1; MG/1
1 TABLET ORAL
Status: DISCONTINUED | OUTPATIENT
Start: 2022-12-06 | End: 2022-12-06 | Stop reason: HOSPADM

## 2022-12-06 RX ORDER — ONDANSETRON 2 MG/ML
INJECTION INTRAMUSCULAR; INTRAVENOUS
Status: DISCONTINUED | OUTPATIENT
Start: 2022-12-06 | End: 2022-12-06

## 2022-12-06 RX ORDER — DEXMEDETOMIDINE HYDROCHLORIDE 100 UG/ML
INJECTION, SOLUTION INTRAVENOUS
Status: DISCONTINUED | OUTPATIENT
Start: 2022-12-06 | End: 2022-12-06

## 2022-12-06 RX ORDER — CEFAZOLIN SODIUM 1 G/50ML
1 SOLUTION INTRAVENOUS ONCE
Status: DISCONTINUED | OUTPATIENT
Start: 2022-12-06 | End: 2022-12-06 | Stop reason: HOSPADM

## 2022-12-06 RX ORDER — ACETAMINOPHEN 10 MG/ML
INJECTION, SOLUTION INTRAVENOUS
Status: DISCONTINUED | OUTPATIENT
Start: 2022-12-06 | End: 2022-12-06

## 2022-12-06 RX ORDER — DIPHENHYDRAMINE HYDROCHLORIDE 50 MG/ML
25 INJECTION INTRAMUSCULAR; INTRAVENOUS EVERY 6 HOURS PRN
Status: DISCONTINUED | OUTPATIENT
Start: 2022-12-06 | End: 2022-12-06 | Stop reason: HOSPADM

## 2022-12-06 RX ORDER — SODIUM CHLORIDE 0.9 % (FLUSH) 0.9 %
SYRINGE (ML) INJECTION
Status: DISCONTINUED | OUTPATIENT
Start: 2022-12-06 | End: 2022-12-06 | Stop reason: HOSPADM

## 2022-12-06 RX ORDER — PROPOFOL 10 MG/ML
VIAL (ML) INTRAVENOUS
Status: DISCONTINUED | OUTPATIENT
Start: 2022-12-06 | End: 2022-12-06

## 2022-12-06 RX ORDER — SODIUM CHLORIDE, SODIUM LACTATE, POTASSIUM CHLORIDE, CALCIUM CHLORIDE 600; 310; 30; 20 MG/100ML; MG/100ML; MG/100ML; MG/100ML
INJECTION, SOLUTION INTRAVENOUS CONTINUOUS
Status: DISCONTINUED | OUTPATIENT
Start: 2022-12-06 | End: 2022-12-06 | Stop reason: HOSPADM

## 2022-12-06 RX ORDER — DEXAMETHASONE SODIUM PHOSPHATE 4 MG/ML
INJECTION, SOLUTION INTRA-ARTICULAR; INTRALESIONAL; INTRAMUSCULAR; INTRAVENOUS; SOFT TISSUE
Status: DISCONTINUED | OUTPATIENT
Start: 2022-12-06 | End: 2022-12-06

## 2022-12-06 RX ORDER — MIDAZOLAM HYDROCHLORIDE 1 MG/ML
INJECTION, SOLUTION INTRAMUSCULAR; INTRAVENOUS
Status: DISCONTINUED | OUTPATIENT
Start: 2022-12-06 | End: 2022-12-06

## 2022-12-06 RX ORDER — ALBUTEROL SULFATE 0.83 MG/ML
2.5 SOLUTION RESPIRATORY (INHALATION) EVERY 4 HOURS PRN
Status: DISCONTINUED | OUTPATIENT
Start: 2022-12-06 | End: 2022-12-06 | Stop reason: HOSPADM

## 2022-12-06 RX ORDER — LIDOCAINE HYDROCHLORIDE 20 MG/ML
INJECTION, SOLUTION EPIDURAL; INFILTRATION; INTRACAUDAL; PERINEURAL
Status: DISCONTINUED | OUTPATIENT
Start: 2022-12-06 | End: 2022-12-06

## 2022-12-06 RX ORDER — SUCCINYLCHOLINE CHLORIDE 20 MG/ML
INJECTION INTRAMUSCULAR; INTRAVENOUS
Status: DISCONTINUED | OUTPATIENT
Start: 2022-12-06 | End: 2022-12-06

## 2022-12-06 RX ORDER — FENTANYL CITRATE 50 UG/ML
25 INJECTION, SOLUTION INTRAMUSCULAR; INTRAVENOUS EVERY 5 MIN PRN
Status: COMPLETED | OUTPATIENT
Start: 2022-12-06 | End: 2022-12-06

## 2022-12-06 RX ORDER — FENTANYL CITRATE 50 UG/ML
INJECTION, SOLUTION INTRAMUSCULAR; INTRAVENOUS
Status: DISCONTINUED | OUTPATIENT
Start: 2022-12-06 | End: 2022-12-06

## 2022-12-06 RX ORDER — ONDANSETRON 2 MG/ML
4 INJECTION INTRAMUSCULAR; INTRAVENOUS ONCE AS NEEDED
Status: DISCONTINUED | OUTPATIENT
Start: 2022-12-06 | End: 2022-12-06 | Stop reason: HOSPADM

## 2022-12-06 RX ADMIN — HYDROMORPHONE HYDROCHLORIDE 0.2 MG: 2 INJECTION INTRAMUSCULAR; INTRAVENOUS; SUBCUTANEOUS at 11:12

## 2022-12-06 RX ADMIN — FENTANYL CITRATE 25 MCG: 50 INJECTION INTRAMUSCULAR; INTRAVENOUS at 10:12

## 2022-12-06 RX ADMIN — DEXTROSE 2 G: 50 INJECTION, SOLUTION INTRAVENOUS at 07:12

## 2022-12-06 RX ADMIN — ROCURONIUM BROMIDE 5 MG: 10 INJECTION, SOLUTION INTRAVENOUS at 07:12

## 2022-12-06 RX ADMIN — KETOROLAC TROMETHAMINE 30 MG: 30 INJECTION, SOLUTION INTRAMUSCULAR at 09:12

## 2022-12-06 RX ADMIN — ACETAMINOPHEN 1000 MG: 10 INJECTION, SOLUTION INTRAVENOUS at 08:12

## 2022-12-06 RX ADMIN — FENTANYL CITRATE 25 MCG: 50 INJECTION, SOLUTION INTRAMUSCULAR; INTRAVENOUS at 09:12

## 2022-12-06 RX ADMIN — DEXMEDETOMIDINE HYDROCHLORIDE 4 MCG: 100 INJECTION, SOLUTION INTRAVENOUS at 09:12

## 2022-12-06 RX ADMIN — FENTANYL CITRATE 25 MCG: 50 INJECTION, SOLUTION INTRAMUSCULAR; INTRAVENOUS at 10:12

## 2022-12-06 RX ADMIN — DEXAMETHASONE SODIUM PHOSPHATE 4 MG: 4 INJECTION, SOLUTION INTRA-ARTICULAR; INTRALESIONAL; INTRAMUSCULAR; INTRAVENOUS; SOFT TISSUE at 07:12

## 2022-12-06 RX ADMIN — SODIUM CHLORIDE, SODIUM LACTATE, POTASSIUM CHLORIDE, AND CALCIUM CHLORIDE: 600; 310; 30; 20 INJECTION, SOLUTION INTRAVENOUS at 07:12

## 2022-12-06 RX ADMIN — HYDROMORPHONE HYDROCHLORIDE 0.2 MG: 2 INJECTION INTRAMUSCULAR; INTRAVENOUS; SUBCUTANEOUS at 10:12

## 2022-12-06 RX ADMIN — PROPOFOL 200 MG: 10 INJECTION, EMULSION INTRAVENOUS at 07:12

## 2022-12-06 RX ADMIN — MIDAZOLAM 2 MG: 1 INJECTION INTRAMUSCULAR; INTRAVENOUS at 07:12

## 2022-12-06 RX ADMIN — SUCCINYLCHOLINE CHLORIDE 140 MG: 20 INJECTION, SOLUTION INTRAMUSCULAR; INTRAVENOUS at 07:12

## 2022-12-06 RX ADMIN — ONDANSETRON 4 MG: 2 INJECTION, SOLUTION INTRAMUSCULAR; INTRAVENOUS at 09:12

## 2022-12-06 RX ADMIN — FENTANYL CITRATE 100 MCG: 50 INJECTION, SOLUTION INTRAMUSCULAR; INTRAVENOUS at 07:12

## 2022-12-06 RX ADMIN — SODIUM CHLORIDE, SODIUM LACTATE, POTASSIUM CHLORIDE, AND CALCIUM CHLORIDE: 600; 310; 30; 20 INJECTION, SOLUTION INTRAVENOUS at 09:12

## 2022-12-06 RX ADMIN — LIDOCAINE HYDROCHLORIDE 80 MG: 20 INJECTION, SOLUTION EPIDURAL; INFILTRATION; INTRACAUDAL; PERINEURAL at 07:12

## 2022-12-06 NOTE — ANESTHESIA PROCEDURE NOTES
Intubation    Date/Time: 12/6/2022 7:15 AM  Performed by: Frank Rubin CRNA  Authorized by: Jennifer Mejía MD     Intubation:     Induction:  Intravenous    Intubated:  Postinduction    Mask Ventilation:  Easy mask    Attempts:  1    Attempted By:  CRNA    Method of Intubation:  Direct    Blade:  Keene 2    Laryngeal View Grade: Grade I - full view of cords      Difficult Airway Encountered?: No      Complications:  None    Airway Device:  Oral endotracheal tube    Airway Device Size:  7.5    Style/Cuff Inflation:  Cuffed (inflated to minimal occlusive pressure)    Tube secured:  24    Secured at:  The lips    Placement Verified By:  Capnometry    Complicating Factors:  None    Findings Post-Intubation:  BS equal bilateral and atraumatic/condition of teeth unchanged

## 2022-12-06 NOTE — ANESTHESIA POSTPROCEDURE EVALUATION
Anesthesia Post Evaluation    Patient: Taras Bach    Procedure(s) Performed: Procedure(s) (LRB):  REPAIR, LIGAMENT, ANKLE LATERAL RECONSTRUCTION (Right)  ORIF, ANKLE SYNDESMOSIS (Right)  REPAIR, TENDON, PERONEAL, DELTOID (Right)  ARTHROSCOPY, ANKLE, WITH DEBRIDEMENT (Right)  REMOVAL, LOOSE BODY, JOINT, ARTHROSCOPIC (Right)    Final Anesthesia Type: general      Patient location during evaluation: PACU  Patient participation: Yes- Able to Participate  Level of consciousness: awake and alert and oriented  Post-procedure vital signs: reviewed and stable  Pain management: adequate  Airway patency: patent    PONV status at discharge: No PONV  Anesthetic complications: no      Cardiovascular status: blood pressure returned to baseline, stable and hemodynamically stable  Respiratory status: unassisted  Hydration status: euvolemic  Follow-up not needed.          Vitals Value Taken Time   /70 12/06/22 1205   Temp 37.1 °C (98.8 °F) 12/06/22 1200   Pulse 66 12/06/22 1206   Resp 13 12/06/22 1206   SpO2 96 % 12/06/22 1206   Vitals shown include unvalidated device data.      Event Time   Out of Recovery 11:20:00         Pain/Gilbret Score: Pain Rating Prior to Med Admin: 7 (12/6/2022 11:51 AM)  Gilbert Score: 10 (12/6/2022 12:15 PM)

## 2022-12-06 NOTE — TRANSFER OF CARE
"Anesthesia Transfer of Care Note    Patient: Taras Bach    Procedure(s) Performed: Procedure(s) (LRB):  REPAIR, LIGAMENT, ANKLE LATERAL RECONSTRUCTION (Right)  ORIF, ANKLE SYNDESMOSIS (Right)  ARTHROSCOPY, ANKLE (Right)  REPAIR, TENDON, PERONEAL, DELTOID (Right)    Patient location: PACU    Anesthesia Type: general    Transport from OR: Transported from OR on room air with adequate spontaneous ventilation    Post pain: adequate analgesia    Post assessment: no apparent anesthetic complications and tolerated procedure well    Post vital signs: stable    Level of consciousness: awake    Nausea/Vomiting: no nausea/vomiting    Complications: none    Transfer of care protocol was followed      Last vitals:   Visit Vitals  BP (!) 144/90 (BP Location: Right arm, Patient Position: Sitting)   Pulse 61   Temp 37 °C (98.6 °F) (Temporal)   Resp 18   Ht 6' 3" (1.905 m)   Wt 106.1 kg (233 lb 12.8 oz)   SpO2 100%   BMI 29.22 kg/m²     "

## 2022-12-06 NOTE — DISCHARGE INSTRUCTIONS
Nozin Instructions  Goal: the goal of Nozin is to reduce the risk of post-procedural infections by bacteria in the nasal cavity. Think of it as hand  for your nose.    How to use:    1. Shake Nozin bottle well    2. Take a cotton swab and apply 4 drops to one tip    3. Insert cotton tip into one nostril, being sure not to go deeper into nose than tip of the swab.    4. Swab nostril 6 times counterclockwise and 6 times clockwise. Make sure to swab the inside front pocket of the nostril.    5. Take swab out and apply 2 drops to the same cotton tip. Repeat steps 3 and 4 in the other nostril.        Do steps 1-5 twice a day for 7 days.        Non-weight bearing.  Leave dressing, CAM boot in place until first f/u visit on 12/21/2022.

## 2022-12-07 NOTE — OP NOTE
"  DATE OF SURGERY:  12/6/22    PREOPERATIVE DIAGNOSIS:    1.  Dislocating/subluxating peroneal tendons, right  2.  Right anterolateral ankle impingement.  3.  Syndesmosis injury, right  4.  Peroneus longus split tear, right  5.  Peroneus brevis tendosynovitis, right  6.  Lateral ligamentous complex injury, right  7.  Deltoid ligament injury, right    POSTOPERATIVE DIAGNOSIS:    Same + multiple loose bodies within tibiotalar joint    PROCEDURE:    Right ankle arthroscopy and debridement with removal of loose body  ORIF of syndesmosis  SPR reconstruction  Peroneus longus repair  Debridement of peroneus brevis tenosynovitis  Manual stress examination of ankle     OPERATING PHYSICIAN:  Ruma Rodriguez MD    ASSISTANT:  Dr. Aayush Amos    COMPLICATIONS:  None      SPECIMENS:  None    TOURNIQUET:  per records    ESTIMATED BLOOD LOSS:  <20 cc    IMPLANTS:  Butler 1/3 tubular plate, Drummond medical Synchfix device X 2    PROCEDURE IN DETAIL:    The patient was met in the preoperative holding unit.  The ankle was marked with a "yes" by me.  Informed consent was again reviewed. No block was placed. Again he confirmed that prior to this injury he didn't suffer from chronic ankle sprains.      The patient was then brought to the operating room and placed supine on the operating table.   The patient was placed under general anesthesia by the Anesthesia Team and given a dose of IV antibiotics.  Thigh tourniquet was applied to the operative extremity.  The leg was prepped and draped in a standard sterile fashion. Time-out was performed to verify the correct patient ID, operative site, and procedure(s).  All members of the OR team were in agreement with the time out.      I performed an examination under anesthesia which demonstrated no gross instability on the right sided anterior draw and talar tilt tests, which is why I decided not to repair the medial or lateral ankle ligaments.  His main complaint of pain was laterally based " around his peroneal tendons which were subluxatable.  His SP nerve was marked out prior to the beginning of the procedure.    We exsanguinated the leg with an Esmarch bandage and inflated the tourniquet.  We started performing standard anterior ankle arthroscopy through anteromedial and anterolateral portals using noninvasive ankle distraction.  Portal sites were created using a nick and spread technique to avoid injury to subcutaneous nerve branches.  Upon introduction of the arthroscope, there was some synovitis in the anterior aspect of the ankle that we debrided.  The cartilage on the talar side had some mild scuffing, mostly on the medial and lateral gutters; however, no full-thickness lesions.  The tibial cartilage was healthy.  We then probed the cartilage on the talus and there was no evidence of unstable osteochondral lesion or any sort of cartilage damage.  The syndesmosis was found to be grossly unstable.  Deep deltoid ligament was torn. He had multiple loose bodies within the tibiotalar joint which were removed. There was some hypertrophic scar in the anterolateral ankle gutter that I debrided with an arthroscopic shaver. I also debrided the torn syndesmosis with the arthroscopic shaver.  Then, copiously lavaged the joint, removed the arthroscopic instruments.     We then made an approximately 8 cm slightly curved incision along the course of the peroneal tendons.  Subcutaneous tissues were bluntly dissected.  Great care was taken to avoid injury to the sural nerve.  I then opened the peroneal tendon sheath proximal to the peroneal tubercle.  There was an increase in the amount of fluid in the sheath that we evacuated.  His peroneus longus was able to be dislocated.  Although the SPR was not completely torn, there was a pseudopouch in it.   We transected the retinaculum and marked it with a 2-0 Vicryl suture for later repair.  We inspected the peroneus longus.  It had a 3cm longitudinal tear.  I then  inspected the peroneus brevis. The brevis had tenosynovitis that we debrided as well as a low lying muscle belly that was resected.    Our attention was turned back to the peroneus longus.  The tear was debrided and tendon was roughened up and then was repaired with 3-0 vicryl.   Other than the tear, the longus tendon did not seem to be in terrible condition.    Our attention was then turned to the SPR repair.  We roughened up the distal fibular bone with a rongeur and curette.  We then drilled 3 1.5 mm holes in the distal fibula for the SPR repair.  The groove did not appear to need deepening.  We then repaired the superior peroneal retinaculum with multiple 2-0 Orthocord sutures using a pants over vest technique.  We ranged the ankle in hindfoot through full range of motion and found the peroneus brevis and longus to glide freely.     Our attention was then turned to the syndesmosis ORIF. I carried the dissection up the distal fibula. Care was taken to protect the SP nerve. I placed a 3 hole 1/3 tubular plate (Tendr) and secured it proximally and under fluoroscopy with a nonlocking screw. I then placed two Drummond medical synch fix devices across the syndesmosis. We verified the medial button was on bone by making a medially based incision and dissecting down through periosteum.  When reducing the syndesmosis we held the ankle in neutral dorsiflexion and tightened the devices.      We then performed a stress external rotation examination and found the syndesmosis to be stable. We obtained final fluoroscopic images.     The tourniquet was released.  Hemostasis was achieved. Then wounds were then copiously irrigated and closed in layers using 3-0 Monocryl and 3-0 nylon suture.  Sterile dressing was applied.  Leg was placed into a short leg splint with ample amounts of padding over the heel. The patient awoke from anesthesia without complication and was transferred to the recovery room in stable condition.  All  counts were correct x 2 at the end of the procedure.       POST-OP PLAN:  WB Status:  NWB X 6 weeks. 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   Physical therapy: to begin Friday  DVT prophylaxis: full dose ASA while NWB  Antibiotics: n/a  Pain control: given percocet and anti-inflammatories  Dressing: placed in boot. Do not get sutures wet until they have been removed  If questions call [phone] and ask for Dr. Ruma Rodriguez.  Disposition: Follow-up with me as scheduled.  XR at f/u NWB 3 views ankle

## 2022-12-09 ENCOUNTER — CLINICAL SUPPORT (OUTPATIENT)
Dept: REHABILITATION | Facility: HOSPITAL | Age: 32
End: 2022-12-09
Attending: ORTHOPAEDIC SURGERY
Payer: COMMERCIAL

## 2022-12-09 DIAGNOSIS — S93.331A SUBLUXATION OF PERONEAL TENDON OF RIGHT FOOT: ICD-10-CM

## 2022-12-09 DIAGNOSIS — M25.671 DECREASED RANGE OF MOTION OF RIGHT ANKLE: ICD-10-CM

## 2022-12-09 DIAGNOSIS — R26.9 GAIT ABNORMALITY: ICD-10-CM

## 2022-12-09 DIAGNOSIS — M25.871 ANKLE IMPINGEMENT SYNDROME, RIGHT: ICD-10-CM

## 2022-12-09 DIAGNOSIS — R29.898 DECREASED STRENGTH OF LOWER EXTREMITY: ICD-10-CM

## 2022-12-09 PROCEDURE — 97110 THERAPEUTIC EXERCISES: CPT

## 2022-12-09 PROCEDURE — 97161 PT EVAL LOW COMPLEX 20 MIN: CPT

## 2022-12-12 ENCOUNTER — CLINICAL SUPPORT (OUTPATIENT)
Dept: REHABILITATION | Facility: HOSPITAL | Age: 32
End: 2022-12-12
Attending: FAMILY MEDICINE
Payer: COMMERCIAL

## 2022-12-12 DIAGNOSIS — R26.9 GAIT ABNORMALITY: ICD-10-CM

## 2022-12-12 DIAGNOSIS — M25.671 DECREASED RANGE OF MOTION OF RIGHT ANKLE: Primary | ICD-10-CM

## 2022-12-12 DIAGNOSIS — R29.898 DECREASED STRENGTH OF LOWER EXTREMITY: ICD-10-CM

## 2022-12-12 PROBLEM — M25.673 DECREASED ROM OF ANKLE: Status: ACTIVE | Noted: 2022-12-12

## 2022-12-12 PROCEDURE — 97110 THERAPEUTIC EXERCISES: CPT

## 2022-12-12 PROCEDURE — 97140 MANUAL THERAPY 1/> REGIONS: CPT

## 2022-12-12 NOTE — PLAN OF CARE
[facility]  Physical Therapy Initial Evaluation    Name: Taras [facility] Number: [id number]    Therapy Diagnosis:   Encounter Diagnoses   Name Primary?    Subluxation of peroneal tendon of right foot     Ankle impingement syndrome, right     Decreased range of motion of right ankle     Decreased strength of lower extremity     Gait abnormality      Physician: Aayush Amos MD    Physician Orders: PT Eval and Treat   Medical Diagnosis from Referral: Subluxation of peroneal tendon of right foot [S93.331A], Ankle impingement syndrome, right [M25.871]  Evaluation Date: 12/9/2022  Authorization Period Expiration: 12/6/23  Plan of Care Expiration: 5/29/23  Visit # / Visits authorized: 1/ 1    Time In: 1:00 PM  Time Out: 2:00 PM    Total Billable Time: 60 minutes    Precautions: Standard       PROCEDURE:    Right ankle arthroscopy and debridement with removal of loose body  ORIF of syndesmosis  SPR reconstruction  Peroneus longus repair  Debridement of peroneus brevis tenosynovitis  Manual stress examination of ankle  POST-OP PLAN:  WB Status:  NWB X 6 weeks. 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   Physical therapy: to begin Friday  DVT prophylaxis: full dose ASA while NWB  Antibiotics: n/a  Pain control: given percocet and anti-inflammatories  Dressing: placed in boot. Do not get sutures wet until they have been removed  If questions call [phone] and ask for Dr. Ruma Rodriguez.  Disposition: Follow-up with me as scheduled.  XR at f/u NWB 3 views ankle    NWB for 6 weeks, no eversion for 6 weeks, passive and active range of motion  Dr. Rodriguez protocol    Subjective   Date of onset: August 19th  History of current condition - [facility] reports: s/p peroneal repair with SPR reconstruction  syndesmosis ORIF by Dr. Rodriguez 12/6/22. 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 Initially was not able to walk off the field. Was able to return to practice but was never able to return to prior level unable to cut without feeling unstable. 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 Initially, shut football activities down and rehabbed until mid October but never could get to where he could plant his leg without it giving. Denies n/t, constitutional signs,. Plays wide . Wants to return to for combine/NFL try outs or upcoming CFL season. Taking pain medication as needed for pain     Medical History:   No past medical history on file.    Surgical History:   Taras Bach  has a past surgical history that includes Hand surgery; Hernia repair; Repair of ligament of ankle (Right, 12/6/2022); Open reduction and internal fixation (ORIF) of injury of ankle (Right, 12/6/2022); Repair of Achilles tendon (Right, 12/6/2022); Arthroscopy of ankle with debridement (Right, 12/6/2022); and Arthroscopic removal of loose body from joint (Right, 12/6/2022).    Medications:   Taras has a current medication list which includes the following prescription(s): aspirin, docusate sodium, ibuprofen, ondansetron, and oxycodone-acetaminophen.    Allergies:   Review of patient's allergies indicates:   Allergen Reactions    Amoxicillin (bulk)     Penicillins         Imaging, MRI studies: Impression:     Complete ATFL and deltoid ligament tears.     High-grade partial-thickness anterior inferior tibiofibular ligament tear with probable tear of the inferior tibiofibular syndesmosis.     Grade 2 PTFL and calcaneofibular ligament sprains.     Longitudinally split tear through the peroneal longus tendon with associated tenosynovitis.     Extensor digitorum tenosynovitis.    Prior Therapy: Yes for   Social History: Lives with family   Occupation: professional football player  Prior Level of Function: Independent with ADL's,  full participation with professional football activiites  Current Level of Function: Limited with walking, ADL's, occupational duties    Pain:  Current 4/10, worst 9/10, best 0/10   Location: right ankle  Description: Aching  Aggravating Factors: Standing, Walking, and Getting out of bed/chair  Easing Factors: pain medication, ice, and rest    Pts goals: Return to 100% for upcoming season    Objective     Observation: wounds clean and dry, no signs of excessive drainage or infection    Gait: Patient enters clinic NWB with walking boot donned    Functional Tests: not tested 2/2 post-operative status    Ankle Active Range of Motion:   Ankle Right Left   DF Lacking 10 5   Plantarflexion 30 50   Inversion N/t 30   Eversion N/t 25      Ankle Passive Range of Motion:   Ankle Right Left   DF (knee extended) Lacking 10 10   DF (knee bent) Lacking 10 10   Plantarflexion 35 55   Inversion N/t 35   Eversion N/t 25   1st MTP Extension  70 60       Lower Extremity Strength: not testing 2/2 post operative status      Special Tests: not assess 2/2 post-operative status  Joint Mobility: decreased talocrural joint mobility      Palpation: TTP along incision sites and joint line      Neural provocation:  n/t      CMS Impairment/Limitation/Restriction for FOTO Ankle Survey    Therapist reviewed FOTO scores for Taras Bach on 12/9/2022.   FOTO documents entered into [employer] - see Media section.    Limitation Score: TBD%         TREATMENT   Treatment Time In: 1:30 PM  Treatment Time Out: 2:00 PM  Total Treatment time separate from Evaluation: 30 minutes    Taras received therapeutic exercises to develop strength, ROM, flexibility, and posture for 20 minutes including:  Ankle pumps 30x  Toe yoga 30x  Towel curls 30x  Seated heel slide PROM into DF  SLR x30  SL hip ABD x30    Taras received the following manual therapy techniques: Joint mobilizations and Soft tissue Mobilization were applied to the: R ankle for 10 minutes,  including:  Grade I a/p talocrural joint mobs      Home Exercises and Patient Education Provided    Education provided:   - HEP, POC, symptom management, recovery timeline    Written Home Exercises Provided: yes.  Exercises were reviewed and Taras was able to demonstrate them prior to the end of the session.  Taras demonstrated good  understanding of the education provided.     See EMR under Patient Instructions for exercises provided 12/9/2022.    Assessment   Taras is a 32 y.o. male referred to outpatient Physical Therapy with a medical diagnosis of Subluxation of peroneal tendon of right foot [S93.331A], Ankle impingement syndrome, right [M25.871]. Pt presents with complaints of R ankle pain and limited functional status 2/2 post-operative status. Upon evaluation, patient presents with decreased ROM, joint mobility, flexibility restrictions, decreased strength and motor control deficits limited ability to perform ADL's, ambulation and occupational duties. Patient would benefit from appropriate manual therapy, mobility, flexibility, strengthening and NM re-education in order to normalize R ankle ROM and improve functional status in order to return to professional football.    Pt prognosis is Excellent.   Pt will benefit from skilled outpatient Physical Therapy to address the deficits stated above and in the chart below, provide pt/family education, and to maximize pt's level of independence.     Plan of care discussed with patient: Yes  Pt's spiritual, cultural and educational needs considered and patient is agreeable to the plan of care and goals as stated below:     Anticipated Barriers for therapy: none    Medical Necessity is demonstrated by the following  History  Co-morbidities and personal factors that may impact the plan of care Co-morbidities:   See medical chart    Personal Factors:   no deficits     low   Examination  Body Structures and Functions, activity limitations and participation  restrictions that may impact the plan of care Body Regions:   lower extremities    Body Systems:    gross symmetry  ROM  strength  gross coordinated movement  balance  gait  motor control  motor learning  edema  scar formation    Participation Restrictions:   Football practice, recreational exercises    Activity limitations:   Learning and applying knowledge  no deficits    General Tasks and Commands  no deficits    Communication  no deficits    Mobility  lifting and carrying objects  walking  driving (bike, car, motorcycle)    Self care  washing oneself (bathing, drying, washing hands)  caring for body parts (brushing teeth, shaving, grooming)  toileting  dressing    Domestic Life  shopping  cooking  doing house work (cleaning house, washing dishes, laundry)    Interactions/Relationships  no deficits    Life Areas  no deficits    Community and Social Life  no deficits         low   Clinical Presentation stable and uncomplicated low   Decision Making/ Complexity Score: low     Goals:  Short Term Goals (4 Weeks):  1.  Pts pain level decreased to 75% or greater for with  for restoring functional mobility and ADL.  2. Pts AROM in R DF increased by 10 degrees to restore functional mobility and ADL  3. Pts strength in the RLE increased by one grade to facilitate improved active mobility and functional stability  4. Pt will be independent with HEP for self-management.   5. Pt to exhibit dynamic stability on the RLE / LLE for stable functional mobility and gait.   6. Pt to demonstrate symmetrical weight bearing w/o pain to improve gait pattern with assistive device     Long term goals (24 weeks)  1.pt will pass return to running criteria and hopping progression for return to running  2. pt will demonstrate at least 95% LSI with figure 8, lateral, and square hop testing for decreased reinjury risk  3. pt will demonstrate appropriate SL calf raise endurance compared to age norms with incline calf raise test for improved  ankle function  4. Patient will demonstrate appropriate mechanics with sport specific activities for full return to sport  5. Patient will improve FOTO score to </= TBD% limited to decrease perceived limitation with mobility.      Plan   Plan of care Certification: 12/9/2022 to 5/29/23.    Outpatient Physical Therapy 2 times weekly for 24 weeks to include the following interventions: Electrical Stimulation NMES, Gait Training, Manual Therapy, Moist Heat/ Ice, Neuromuscular Re-ed, Patient Education, Self Care, Therapeutic Activities, and Therapeutic Exercise.     BRITTNEE OLIVEIRA, PT

## 2022-12-12 NOTE — PROGRESS NOTES
Physical Therapy Daily Treatment Note     Name: Taras Bach  [facility] Number: [id number]    Therapy Diagnosis:   Encounter Diagnoses   Name Primary?    Decreased range of motion of right ankle Yes    Decreased strength of lower extremity     Gait abnormality      Physician: Aayush Amos MD    Visit Date: 12/12/2022     Physician Orders: PT Eval and Treat   Medical Diagnosis from Referral: Subluxation of peroneal tendon of right foot [S93.331A], Ankle impingement syndrome, right [M25.871]  Evaluation Date: 12/9/2022  Authorization Period Expiration: 12/6/23  Plan of Care Expiration: 5/29/23  Visit # / Visits authorized: 1/ 1      Time In: 3:05 PM  Time Out: 4:06 PM  Total Billable Time: 61 minutes    Precautions: Standard  PROCEDURE:    Right ankle arthroscopy and debridement with removal of loose body  ORIF of syndesmosis  SPR reconstruction  Peroneus longus repair  Debridement of peroneus brevis tenosynovitis  Manual stress examination of ankle  POST-OP PLAN:  WB Status:  NWB X 6 weeks. 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   Physical therapy: to begin Friday  DVT prophylaxis: full dose ASA while NWB  Antibiotics: n/a  Pain control: given percocet and anti-inflammatories  Dressing: placed in boot. Do not get sutures wet until they have been removed  If questions call [phone] and ask for Dr. Ruma Rodriguez.  Disposition: Follow-up with me as scheduled.  XR at f/u NWB 3 views ankle     NWB for 6 weeks, no eversion for 6 weeks, passive and active range of motion  Dr. Rodriguez protocol  Subjective     Pt reports: Feeling good over the weekend. I have one of those therabands that I was using to help stretch my ankle  He was compliant with home exercise program.  Response to previous treatment: ongoing  Functional change: ongoing    Pain: 2/10  Location: right ankles     Objective     Taras received therapeutic exercises to develop strength, endurance, ROM,  and flexibility for 40 minutes including:  Gastroc/soleus stretch 30s x5 each  Ankle pumps GTB 30x  Toe yoga 30x  Towel curls 30x 2#  Seated heel slide PROM into DF  SLR 3x fatigue 5#  SL hip ABD 5# 3x1 fatigue      Taras received the following manual therapy techniques: Joint mobilizations and Soft tissue Mobilization were applied to the: R ankle for 21 minutes, including:  PROM within post-op protocol parameters  Dressing change 5'  Grade II a/p talocrural joint mobs    Taras participated in neuromuscular re-education activities to improve: Balance, Coordination, Kinesthetic, Sense, and Proprioception for 00 minutes. The following activities were included:      Taras participated in dynamic functional therapeutic activities to improve functional performance for 0  minutes, includin    Taras participated in gait training to improve functional mobility and safety for 0  minutes, includin    Home Exercises Provided and Patient Education Provided     Education provided:   - HEP, POC, precautions, recovery timeline    Written Home Exercises Provided: Patient instructed to cont prior HEP.  Exercises were reviewed and Taras was able to demonstrate them prior to the end of the session.  Taras demonstrated good  understanding of the education provided.     See EMR under Patient Instructions for exercises provided prior visit.    Assessment   Patient completed session as noted above with improvement noted in passive/active DF/PF. Initially restricted into both DF/PF as expected that response well to manual therapy techniques. Able to tolerate theraband active/PF with no complaints of pain. Overall progressing well at this time. Discussed no active eversion or passive inversion at this time to protect surgical repair.    Taras Is progressing well towards his goals.   Pt prognosis is Excellent.     Pt will continue to benefit from skilled outpatient physical therapy to address the  deficits listed in the problem list box on initial evaluation, provide pt/family education and to maximize pt's level of independence in the home and community environment.     Pt's spiritual, cultural and educational needs considered and pt agreeable to plan of care and goals.     Anticipated barriers to physical therapy: none    Goals: Goals:  Short Term Goals (4 Weeks):  1.  Pts pain level decreased to 75% or greater for with  for restoring functional mobility and ADL.  2. Pts AROM in R DF increased by 10 degrees to restore functional mobility and ADL  3. Pts strength in the RLE increased by one grade to facilitate improved active mobility and functional stability  4. Pt will be independent with HEP for self-management.   5. Pt to exhibit dynamic stability on the RLE / LLE for stable functional mobility and gait.   6. Pt to demonstrate symmetrical weight bearing w/o pain to improve gait pattern with assistive device      Long term goals (24 weeks)  1.pt will pass return to running criteria and hopping progression for return to running  2. pt will demonstrate at least 95% LSI with figure 8, lateral, and square hop testing for decreased reinjury risk  3. pt will demonstrate appropriate SL calf raise endurance compared to age norms with incline calf raise test for improved ankle function  4. Patient will demonstrate appropriate mechanics with sport specific activities for full return to sport  5. Patient will improve FOTO score to </= TBD% limited to decrease perceived limitation with mobility.    Plan     Plan of care Certification: 12/9/2022 to 5/29/23.     Outpatient Physical Therapy 2 times weekly for 24 weeks to include the following interventions: Electrical Stimulation NMES, Gait Training, Manual Therapy, Moist Heat/ Ice, Neuromuscular Re-ed, Patient Education, Self Care, Therapeutic Activities, and Therapeutic Exercise.     BRITTNEE OLIVEIRA, PT

## 2022-12-16 ENCOUNTER — CLINICAL SUPPORT (OUTPATIENT)
Dept: REHABILITATION | Facility: HOSPITAL | Age: 32
End: 2022-12-16
Payer: COMMERCIAL

## 2022-12-16 DIAGNOSIS — R26.9 GAIT ABNORMALITY: ICD-10-CM

## 2022-12-16 DIAGNOSIS — R29.898 DECREASED STRENGTH OF LOWER EXTREMITY: ICD-10-CM

## 2022-12-16 DIAGNOSIS — M25.671 DECREASED RANGE OF MOTION OF RIGHT ANKLE: Primary | ICD-10-CM

## 2022-12-16 PROCEDURE — 97140 MANUAL THERAPY 1/> REGIONS: CPT

## 2022-12-16 PROCEDURE — 97110 THERAPEUTIC EXERCISES: CPT

## 2022-12-16 NOTE — PROGRESS NOTES
Physical Therapy Daily Treatment Note     Name: Taras Bach  [facility] Number: [id number]    Therapy Diagnosis:   Encounter Diagnoses   Name Primary?    Decreased range of motion of right ankle Yes    Decreased strength of lower extremity     Gait abnormality        Physician: Aayush Amos MD    Visit Date: 12/16/2022     Physician Orders: PT Eval and Treat   Medical Diagnosis from Referral: Subluxation of peroneal tendon of right foot [S93.331A], Ankle impingement syndrome, right [M25.871]  Evaluation Date: 12/9/2022  Authorization Period Expiration: 12/6/23  Plan of Care Expiration: 5/29/23  Visit # / Visits authorized: 2/ 20      Time In: 2:10 PM  Time Out: 3:05 PM  Total Billable Time: 55 minutes    Precautions: Standard  PROCEDURE:  DOS: 12/6/22 10 days s/p  Right ankle arthroscopy and debridement with removal of loose body  ORIF of syndesmosis  SPR reconstruction  Peroneus longus repair  Debridement of peroneus brevis tenosynovitis  Manual stress examination of ankle  POST-OP PLAN:  WB Status:  NWB X 6 weeks. 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   Physical therapy: to begin Friday  DVT prophylaxis: full dose ASA while NWB  Antibiotics: n/a  Pain control: given percocet and anti-inflammatories  Dressing: placed in boot. Do not get sutures wet until they have been removed  If questions call [phone] and ask for Dr. Ruma Rodriguez.  Disposition: Follow-up with me as scheduled.  XR at f/u NWB 3 views ankle     NWB for 6 weeks, no eversion for 6 weeks, passive and active range of motion  Dr. Rodriguez protocol  Subjective     Pt reports: Ankle is feeling better each day. Not even taking pain medication at this point  He was compliant with home exercise program.  Response to previous treatment: ongoing  Functional change: ongoing    Pain: 2/10  Location: right ankles     Objective     Taras received therapeutic exercises to develop strength, endurance,  ROM, and flexibility for 40 minutes including:  Gastroc/soleus stretch 30s x5 each  Ankle pumps GTB 30x  Ankle BTB: PF/DF 3x10  Toe yoga 30x  Towel curls 30x 2#  Seated heel slide PROM into DF  SLR 3x fatigue 5#  SL hip ABD 5# 3x fatigue  Seated hassan ropes for CV endurance: 30s x4        Taras received the following manual therapy techniques: Joint mobilizations and Soft tissue Mobilization were applied to the: R ankle for 15 minutes, including:  PROM within post-op protocol parameters  Dressing change 5'  Grade II a/p talocrural joint mobs    Taras participated in neuromuscular re-education activities to improve: Balance, Coordination, Kinesthetic, Sense, and Proprioception for 00 minutes. The following activities were included:      aTras participated in dynamic functional therapeutic activities to improve functional performance for 0  minutes, includin    Taras participated in gait training to improve functional mobility and safety for 0  minutes, includin    Home Exercises Provided and Patient Education Provided     Education provided:   - HEP, POC, precautions, recovery timeline    Written Home Exercises Provided: Patient instructed to cont prior HEP.  Exercises were reviewed and Taras was able to demonstrate them prior to the end of the session.  Taras demonstrated good  understanding of the education provided.     See EMR under Patient Instructions for exercises provided prior visit.    Assessment   Patient completed session as noted above with continued improvements noted into DF/PF but continues to be restricted at end range as expected at this stage of rehab. Continued with previous plan for max protection of ROM at this stage of rehab in order to protect repaid. Intro'd UE hassan rope slams to address patients CV endurance with good response. . Overall progressing well at this time. Discussed no active eversion or passive inversion at this time to protect surgical  fabby Grajeda Is progressing well towards his goals.   Pt prognosis is Excellent.     Pt will continue to benefit from skilled outpatient physical therapy to address the deficits listed in the problem list box on initial evaluation, provide pt/family education and to maximize pt's level of independence in the home and community environment.     Pt's spiritual, cultural and educational needs considered and pt agreeable to plan of care and goals.     Anticipated barriers to physical therapy: none    Goals: Goals:  Short Term Goals (4 Weeks):  1.  Pts pain level decreased to 75% or greater for with  for restoring functional mobility and ADL.  2. Pts AROM in R DF increased by 10 degrees to restore functional mobility and ADL  3. Pts strength in the RLE increased by one grade to facilitate improved active mobility and functional stability  4. Pt will be independent with HEP for self-management.   5. Pt to exhibit dynamic stability on the RLE / LLE for stable functional mobility and gait.   6. Pt to demonstrate symmetrical weight bearing w/o pain to improve gait pattern with assistive device      Long term goals (24 weeks)  1.pt will pass return to running criteria and hopping progression for return to running  2. pt will demonstrate at least 95% LSI with figure 8, lateral, and square hop testing for decreased reinjury risk  3. pt will demonstrate appropriate SL calf raise endurance compared to age norms with incline calf raise test for improved ankle function  4. Patient will demonstrate appropriate mechanics with sport specific activities for full return to sport  5. Patient will improve FOTO score to </= TBD% limited to decrease perceived limitation with mobility.    Plan     Plan of care Certification: 12/9/2022 to 5/29/23.     Outpatient Physical Therapy 2 times weekly for 24 weeks to include the following interventions: Electrical Stimulation NMES, Gait Training, Manual Therapy, Moist Heat/ Ice, Neuromuscular  Re-ed, Patient Education, Self Care, Therapeutic Activities, and Therapeutic Exercise.     BRITTNEE OLIVEIRA, PT

## 2022-12-21 ENCOUNTER — CLINICAL SUPPORT (OUTPATIENT)
Dept: REHABILITATION | Facility: HOSPITAL | Age: 32
End: 2022-12-21
Payer: COMMERCIAL

## 2022-12-21 DIAGNOSIS — M25.671 DECREASED RANGE OF MOTION OF RIGHT ANKLE: Primary | ICD-10-CM

## 2022-12-21 DIAGNOSIS — R29.898 DECREASED STRENGTH OF LOWER EXTREMITY: ICD-10-CM

## 2022-12-21 DIAGNOSIS — R26.9 GAIT ABNORMALITY: ICD-10-CM

## 2022-12-21 PROCEDURE — 97110 THERAPEUTIC EXERCISES: CPT

## 2022-12-21 PROCEDURE — 97112 NEUROMUSCULAR REEDUCATION: CPT

## 2022-12-21 PROCEDURE — 97530 THERAPEUTIC ACTIVITIES: CPT

## 2022-12-21 PROCEDURE — 97140 MANUAL THERAPY 1/> REGIONS: CPT

## 2022-12-21 NOTE — DISCHARGE SUMMARY
[facility]  Discharge Note  Short Stay    Procedure(s) (LRB):  REPAIR, LIGAMENT, ANKLE LATERAL RECONSTRUCTION (Right)  ORIF, ANKLE SYNDESMOSIS (Right)  REPAIR, TENDON, PERONEAL, DELTOID (Right)  ARTHROSCOPY, ANKLE, WITH DEBRIDEMENT (Right)  REMOVAL, LOOSE BODY, JOINT, ARTHROSCOPIC (Right)      OUTCOME: Patient tolerated treatment/procedure well without complication and is now ready for discharge.    DISPOSITION: Home or Self Care    FINAL DIAGNOSIS:  right peroneal tendon injury, SPR reconstruction, syndesmosis ORIF, peroneal tendon repair    FOLLOWUP: In clinic    DISCHARGE INSTRUCTIONS:    Keep boot in place until follow up visit.  Strict nonweight bearing x6 weeks.  ASA 325mg daily x6 weeks.  All prescriptions given to family prior to procedure.  Follow up established for 12/21/2022.       Clinical Reference Documents Added to Patient Instructions         Document    ANKLE RECONSTRUCTION DISCHARGE INSTRUCTIONS (ENGLISH)            TIME SPENT ON DISCHARGE: 20 minutes

## 2022-12-21 NOTE — PROGRESS NOTES
Physical Therapy Daily Treatment Note     [facility] Number: [id number]    Therapy Diagnosis:   Encounter Diagnoses   Name Primary?    Decreased range of motion of right ankle Yes    Decreased strength of lower extremity     Gait abnormality      Physician: Aayush Amos MD    Visit Date: 12/21/2022     Physician Orders: PT Eval and Treat   Medical Diagnosis from Referral: Subluxation of peroneal tendon of right foot [S93.331A], Ankle impingement syndrome, right [M25.871]  Evaluation Date: 12/9/2022  Authorization Period Expiration: 12/6/23  Plan of Care Expiration: 5/29/23  Visit # / Visits authorized: 3/ 20      Time In: 2:58 PM  Time Out: 4:00 PM  Total Billable Time: 62 minutes    Precautions: Standard  PROCEDURE:  DOS: 12/6/22 10 days s/p  Right ankle arthroscopy and debridement with removal of loose body  ORIF of syndesmosis  SPR reconstruction  Peroneus longus repair  Debridement of peroneus brevis tenosynovitis  Manual stress examination of ankle  POST-OP PLAN:  WB Status:  NWB X 6 weeks. 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   Physical therapy: to begin Friday  DVT prophylaxis: full dose ASA while NWB  Antibiotics: n/a  Pain control: given percocet and anti-inflammatories  Dressing: placed in boot. Do not get sutures wet until they have been removed  If questions call [phone] and ask for Dr. Ruma Rodriguez.  Disposition: Follow-up with me as scheduled.  XR at f/u NWB 3 views ankle     NWB for 6 weeks, no eversion for 6 weeks, passive and active range of motion  Dr. Rodriguez protocol  Subjective     Pt reports: had 2 week f/u with Dr. Rodriguez this morning and she was pleased with my progress. Said continue NWB for another 4 weeks and to continue with no eversion but I can start showering  He was compliant with home exercise program  Response to previous treatment: ongoing  Functional change: ongoing    Pain: 2/10  Location: right  ankles     Objective     Taras received therapeutic exercises to develop strength, endurance, ROM, and flexibility for 17 minutes including:  Gastroc/soleus stretch 30s x5 each  Ankle BTB: PF/DF 3x10  Seated heel slide PROM into DF x30  SLR 3x fatigue 7.5#  SL hip ABD 7.5# 3x fatigue          Taras received the following manual therapy techniques: Joint mobilizations and Soft tissue Mobilization were applied to the: R ankle for 25 minutes, including:  PROM within post-op protocol parameters  Grade II/III a/p talocrural joint mobs  Grade II/III subtalar joint mobilizations  Scar mobilization    Taras participated in neuromuscular re-education activities to improve: Balance, Coordination, Kinesthetic, Sense, and Proprioception for 10 minutes. The following activities were included:  Toe yoga 30x  Towel curls 30x     Taras participated in dynamic functional therapeutic activities to improve functional performance for 10 minutes, including:  Russian twist 10# MB 25x each    Next session  Row machine: 5': with RLE on slider boards to improve CV endurance      Not today:  Seated hassan ropes for CV endurance: 30s x4      Taras participated in gait training to improve functional mobility and safety for 0  minutes, includin    Home Exercises Provided and Patient Education Provided     Education provided:   - HEP, POC, precautions, recovery timeline    Written Home Exercises Provided: Patient instructed to cont prior HEP.  Exercises were reviewed and Delmimk was able to demonstrate them prior to the end of the session.  Delmimk demonstrated good  understanding of the education provided.     See EMR under Patient Instructions for exercises provided prior visit.    Assessment   Patient completed session as noted above with continued improvements noted into DF/PF but continues to be restricted at end range as expected at this stage of rehab 2/2 strict ROM precautions. Continued with previous plan  for max protection of ROM at this stage of rehab in order to protect repaid. Overall progressing well at this time. Continues to be challenged appropriately with proximal hip and core strengthening demo'ing therapeutic effect. Discussed continued plan for no active eversion or passive inversion at this time to protect surgical repair.    Taras Is progressing well towards his goals.   Pt prognosis is Excellent.     Pt will continue to benefit from skilled outpatient physical therapy to address the deficits listed in the problem list box on initial evaluation, provide pt/family education and to maximize pt's level of independence in the home and community environment.     Pt's spiritual, cultural and educational needs considered and pt agreeable to plan of care and goals.     Anticipated barriers to physical therapy: none    Goals: Goals:  Short Term Goals (4 Weeks):  1.  Pts pain level decreased to 75% or greater for with  for restoring functional mobility and ADL.  2. Pts AROM in R DF increased by 10 degrees to restore functional mobility and ADL  3. Pts strength in the RLE increased by one grade to facilitate improved active mobility and functional stability  4. Pt will be independent with HEP for self-management.   5. Pt to exhibit dynamic stability on the RLE / LLE for stable functional mobility and gait.   6. Pt to demonstrate symmetrical weight bearing w/o pain to improve gait pattern with assistive device      Long term goals (24 weeks)  1.pt will pass return to running criteria and hopping progression for return to running  2. pt will demonstrate at least 95% LSI with figure 8, lateral, and square hop testing for decreased reinjury risk  3. pt will demonstrate appropriate SL calf raise endurance compared to age norms with incline calf raise test for improved ankle function  4. Patient will demonstrate appropriate mechanics with sport specific activities for full return to sport  5. Patient will improve  FOTO score to </= TBD% limited to decrease perceived limitation with mobility.    Plan     Plan of care Certification: 12/9/2022 to 5/29/23.     Outpatient Physical Therapy 2 times weekly for 24 weeks to include the following interventions: Electrical Stimulation NMES, Gait Training, Manual Therapy, Moist Heat/ Ice, Neuromuscular Re-ed, Patient Education, Self Care, Therapeutic Activities, and Therapeutic Exercise.     BRITTNEE OLIVEIRA, PT

## 2022-12-21 NOTE — DISCHARGE SUMMARY
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 Wishing to proceed to surgery for definitive fixation.     Hospital Course: Presented for right ankle fixation. Tolerated procedure well and without complications. He will be discharged home with close follow up.        Pending Diagnostic Studies:       None          There are no hospital problems to display for this patient.     Discharged Condition: good    Disposition: Home or Self Care    Follow Up: established for 12/21/2022    Patient Instructions:   Strict NWB x6 weeks.  Keep boot c/d/I until follow up appt, elevate, ice.  ASA 325mg x6 weeks.    Medications:  Given to family prior to procedure.    Juhi Wilkinson PA-C  Orthopedics  [facility]

## 2022-12-28 ENCOUNTER — CLINICAL SUPPORT (OUTPATIENT)
Dept: REHABILITATION | Facility: HOSPITAL | Age: 32
End: 2022-12-28
Payer: COMMERCIAL

## 2022-12-28 DIAGNOSIS — M25.671 DECREASED RANGE OF MOTION OF RIGHT ANKLE: Primary | ICD-10-CM

## 2022-12-28 DIAGNOSIS — R26.9 GAIT ABNORMALITY: ICD-10-CM

## 2022-12-28 DIAGNOSIS — R29.898 DECREASED STRENGTH OF LOWER EXTREMITY: ICD-10-CM

## 2022-12-28 PROCEDURE — 97530 THERAPEUTIC ACTIVITIES: CPT

## 2022-12-28 PROCEDURE — 97140 MANUAL THERAPY 1/> REGIONS: CPT

## 2022-12-28 PROCEDURE — 97110 THERAPEUTIC EXERCISES: CPT

## 2022-12-28 NOTE — PROGRESS NOTES
Physical Therapy Daily Treatment Note     [facility] Number: [id number]    Therapy Diagnosis:   Encounter Diagnoses   Name Primary?    Decreased range of motion of right ankle Yes    Decreased strength of lower extremity     Gait abnormality        Physician: Aayush Amos MD    Visit Date: 12/28/2022     Physician Orders: PT Eval and Treat   Medical Diagnosis from Referral: Subluxation of peroneal tendon of right foot [S93.331A], Ankle impingement syndrome, right [M25.871]  Evaluation Date: 12/9/2022  Authorization Period Expiration: 12/6/23  Plan of Care Expiration: 5/29/23  Visit # / Visits authorized: 4/ 20      Time In: 2:16 PM  Time Out: 3:15 PM  Total Billable Time: 59 minutes    Precautions: Standard  PROCEDURE:  DOS: 12/6/22 3 weeks 1 day s/p  Right ankle arthroscopy and debridement with removal of loose body  ORIF of syndesmosis  SPR reconstruction  Peroneus longus repair  Debridement of peroneus brevis tenosynovitis  Manual stress examination of ankle  POST-OP PLAN:  WB Status:  NWB X 6 weeks. 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   Physical therapy: to begin Friday  DVT prophylaxis: full dose ASA while NWB  Antibiotics: n/a  Pain control: given percocet and anti-inflammatories  Dressing: placed in boot. Do not get sutures wet until they have been removed  If questions call [phone] and ask for Dr. Ruma Rodriguez.  Disposition: Follow-up with me as scheduled.  XR at f/u NWB 3 views ankle     NWB for 6 weeks, no eversion for 6 weeks, passive and active range of motion  Dr. Rodriguez protocol  Subjective     Pt reports: Ankle is feeling better and better. Feeling less stiff     He was compliant with home exercise program  Response to previous treatment: ongoing  Functional change: ongoing    Pain: 0/10  Location: right ankles     Objective   Ankle Passive Range of Motion:   Ankle Right Left   DF (knee extended) 4 10   DF (knee bent) 4  10   Plantarflexion 45 60   Inversion N/t 35   Eversion N/t 25       Taras received therapeutic exercises to develop strength, endurance, ROM, and flexibility for 24 minutes including:  Gastroc/soleus stretch 30s x5 each  Ankle BTB: PF/DF 3x10  Seated heel slide PROM into DF x30    Not today  SLR 3x fatigue 7.5#  SL hip ABD 7.5# 3x fatigue          Taras received the following manual therapy techniques: Joint mobilizations and Soft tissue Mobilization were applied to the: R ankle for 25 minutes, including:  PROM within post-op protocol parameters  Grade II/III a/p talocrural joint mobs  Grade II/III subtalar joint mobilizations  Scar mobilization    Taras participated in neuromuscular re-education activities to improve: Balance, Coordination, Kinesthetic, Sense, and Proprioception for 10 minutes. The following activities were included:  Toe yoga 30x  Towel curls 30x   Seated DL calf raise 3x10    Taras participated in dynamic functional therapeutic activities to improve functional performance for 10 minutes, includinm 1:30  ratio normal:high tempo row machine: 5': with RLE on slider boards to improve CV endurance      Not today:  Russian twist 10# MB 25x each  Seated hassan ropes for CV endurance: 30s x4      Taras participated in gait training to improve functional mobility and safety for 0  minutes, includin    Home Exercises Provided and Patient Education Provided     Education provided:   - HEP, POC, precautions, recovery timeline    Written Home Exercises Provided: Patient instructed to cont prior HEP.  Exercises were reviewed and Taras was able to demonstrate them prior to the end of the session.  Taras demonstrated good  understanding of the education provided.     See EMR under Patient Instructions for exercises provided prior visit.    Assessment   Patient completed session as noted above with continued improvements noted into DF/PF but continues to be restricted at  end range as expected at this stage of rehab 2/2 strict ROM precautions. Continued with previous plan for max protection of ROM at this stage of rehab in order to protect repair with no active/passive inversion/eversion. Overall progressing well at this time in regards to DF/PF ROM. Continues to be challenged appropriately with proximal hip and core strengthening demo'ing therapeutic effect. Will continue to progress as tolerated within post-op protocol parameters    Taras Is progressing well towards his goals.   Pt prognosis is Excellent.     Pt will continue to benefit from skilled outpatient physical therapy to address the deficits listed in the problem list box on initial evaluation, provide pt/family education and to maximize pt's level of independence in the home and community environment.     Pt's spiritual, cultural and educational needs considered and pt agreeable to plan of care and goals.     Anticipated barriers to physical therapy: none    Goals: Goals:  Short Term Goals (4 Weeks):  1.  Pts pain level decreased to 75% or greater for with  for restoring functional mobility and ADL.  2. Pts AROM in R DF increased by 10 degrees to restore functional mobility and ADL  3. Pts strength in the RLE increased by one grade to facilitate improved active mobility and functional stability  4. Pt will be independent with HEP for self-management.   5. Pt to exhibit dynamic stability on the RLE for stable functional mobility and gait.   6. Pt to demonstrate symmetrical weight bearing w/o pain to improve gait pattern with assistive device      Long term goals (24 weeks)  1.pt will pass return to running criteria and hopping progression for return to running  2. pt will demonstrate at least 95% LSI with figure 8, lateral, and square hop testing for decreased reinjury risk  3. pt will demonstrate appropriate SL calf raise endurance compared to age norms with incline calf raise test for improved ankle function  4.  Patient will demonstrate appropriate mechanics with sport specific activities for full return to sport  5. Patient will improve FOTO score to </= TBD% limited to decrease perceived limitation with mobility.    Plan     Plan of care Certification: 12/9/2022 to 5/29/23.     Outpatient Physical Therapy 2 times weekly for 24 weeks to include the following interventions: Electrical Stimulation NMES, Gait Training, Manual Therapy, Moist Heat/ Ice, Neuromuscular Re-ed, Patient Education, Self Care, Therapeutic Activities, and Therapeutic Exercise.     BRITTNEE OLIVEIRA, PT

## 2022-12-30 ENCOUNTER — CLINICAL SUPPORT (OUTPATIENT)
Dept: REHABILITATION | Facility: HOSPITAL | Age: 32
End: 2022-12-30
Payer: COMMERCIAL

## 2022-12-30 DIAGNOSIS — M25.671 DECREASED RANGE OF MOTION OF RIGHT ANKLE: Primary | ICD-10-CM

## 2022-12-30 DIAGNOSIS — R26.9 GAIT ABNORMALITY: ICD-10-CM

## 2022-12-30 DIAGNOSIS — R29.898 DECREASED STRENGTH OF LOWER EXTREMITY: ICD-10-CM

## 2022-12-30 PROCEDURE — 97110 THERAPEUTIC EXERCISES: CPT

## 2022-12-30 PROCEDURE — 97112 NEUROMUSCULAR REEDUCATION: CPT

## 2022-12-30 PROCEDURE — 97140 MANUAL THERAPY 1/> REGIONS: CPT

## 2022-12-30 NOTE — PROGRESS NOTES
Physical Therapy Daily Treatment Note     [facility] Number: [id number]    Therapy Diagnosis:   Encounter Diagnoses   Name Primary?    Decreased range of motion of right ankle Yes    Decreased strength of lower extremity     Gait abnormality          Physician: Aayush Amos MD    Visit Date: 12/30/2022     Physician Orders: PT Eval and Treat   Medical Diagnosis from Referral: Subluxation of peroneal tendon of right foot [S93.331A], Ankle impingement syndrome, right [M25.871]  Evaluation Date: 12/9/2022  Authorization Period Expiration: 12/6/23  Plan of Care Expiration: 5/29/23  Visit # / Visits authorized: 5/ 20      Time In: 1:10 PM  Time Out: 2:11 PM  Total Billable Time: 61 minutes    Precautions: Standard  PROCEDURE:  DOS: 12/6/22 3 weeks 1 day s/p  Right ankle arthroscopy and debridement with removal of loose body  ORIF of syndesmosis  SPR reconstruction  Peroneus longus repair  Debridement of peroneus brevis tenosynovitis  Manual stress examination of ankle  POST-OP PLAN:  WB Status:  NWB X 6 weeks. 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   Physical therapy: to begin Friday  DVT prophylaxis: full dose ASA while NWB  Antibiotics: n/a  Pain control: given percocet and anti-inflammatories  Dressing: placed in boot. Do not get sutures wet until they have been removed  If questions call [phone] and ask for Dr. Ruma Rodriguez.  Disposition: Follow-up with me as scheduled.  XR at f/u NWB 3 views ankle     NWB for 6 weeks, no eversion for 6 weeks, passive and active range of motion  Dr. Rodriguez protocol  Subjective     Pt reports: no complaints upon arrival   He was compliant with home exercise program  Response to previous treatment: ongoing  Functional change: ongoing    Pain: 0/10  Location: right ankles     Objective   Ankle Passive Range of Motion:   Ankle Right Left   DF (knee extended) 4 10   DF (knee bent) 4 10   Plantarflexion 45 60    Inversion N/t 35   Eversion N/t 25       Taras received therapeutic exercises to develop strength, endurance, ROM, and flexibility for 15 minutes including:  Stationary bike 10' to improve CV endurance and tissue extensibility  Gastroc/soleus stretch 30s x5 each  Ankle circles (no eversion): 30x  Ankle pumps BTB x50      Not today  SLR 3x fatigue 7.5#  SL hip ABD 7.5# 3x fatigue  Seated heel slide PROM into DF x30          Taras received the following manual therapy techniques: Joint mobilizations and Soft tissue Mobilization were applied to the: R ankle for 20 minutes, including:  PROM within post-op protocol parameters  Grade II/III a/p talocrural joint mobs  Grade II/III subtalar joint mobilizations  Scar mobilization    Taras participated in neuromuscular re-education activities to improve: Balance, Coordination, Kinesthetic, Sense, and Proprioception for 30 minutes. The following activities were included:  BFR applied for the following at 80% LOP reps/sets: 15/15/15  -SLR  -SL hip ABD   -DL bridges  -LAQ 10#    Not today  Toe yoga 30x  Towel curls 30x   Seated DL calf raise 3x10    Taras participated in dynamic functional therapeutic activities to improve functional performance for 0 minutes, including:      Not today:  1000m 1:30  ratio normal:high tempo row machine: 5': with RLE on slider boards to improve CV endurance  [place] twist 10# MB 25x each  Seated hassan ropes for CV endurance: 30s x4      Taras participated in gait training to improve functional mobility and safety for 0  minutes, includin    Home Exercises Provided and Patient Education Provided     Education provided:   - HEP, POC, precautions, recovery timeline    Written Home Exercises Provided: Patient instructed to cont prior HEP.  Exercises were reviewed and Taars was able to demonstrate them prior to the end of the session.  Taras demonstrated good  understanding of the education provided.     See EMR  under Patient Instructions for exercises provided prior visit.    Assessment   Patient completed session as noted above with continued improvements noted into DF/PF but continues to be restricted at end range as expected at this stage of rehab 2/2 strict ROM precautions. Continued with previous plan for max protection of ROM at this stage of rehab in order to protect repair with no active/passive inversion/eversion. Overall progressing well at this time in regards to DF/PF ROM. Continues to be challenged appropriately with proximal hip and core strengthening demo'ing therapeutic effect. Will continue to progress as tolerated within post-op protocol parameters. Plan to initiate BFR training next session to improve hypertrophy and muscular strength despite NWB status at this time    Taras Is progressing well towards his goals.   Pt prognosis is Excellent.     Pt will continue to benefit from skilled outpatient physical therapy to address the deficits listed in the problem list box on initial evaluation, provide pt/family education and to maximize pt's level of independence in the home and community environment.     Pt's spiritual, cultural and educational needs considered and pt agreeable to plan of care and goals.     Anticipated barriers to physical therapy: none    Goals: Goals:  Short Term Goals (4 Weeks):  1.  Pts pain level decreased to 75% or greater for with  for restoring functional mobility and ADL.  2. Pts AROM in R DF increased by 10 degrees to restore functional mobility and ADL  3. Pts strength in the RLE increased by one grade to facilitate improved active mobility and functional stability  4. Pt will be independent with HEP for self-management.   5. Pt to exhibit dynamic stability on the RLE for stable functional mobility and gait.   6. Pt to demonstrate symmetrical weight bearing w/o pain to improve gait pattern with assistive device      Long term goals (24 weeks)  1.pt will pass return to  running criteria and hopping progression for return to running  2. pt will demonstrate at least 95% LSI with figure 8, lateral, and square hop testing for decreased reinjury risk  3. pt will demonstrate appropriate SL calf raise endurance compared to age norms with incline calf raise test for improved ankle function  4. Patient will demonstrate appropriate mechanics with sport specific activities for full return to sport  5. Patient will improve FOTO score to </= TBD% limited to decrease perceived limitation with mobility.    Plan     Plan of care Certification: 12/9/2022 to 5/29/23.     Outpatient Physical Therapy 2 times weekly for 24 weeks to include the following interventions: Electrical Stimulation NMES, Gait Training, Manual Therapy, Moist Heat/ Ice, Neuromuscular Re-ed, Patient Education, Self Care, Therapeutic Activities, and Therapeutic Exercise.     BRITTNEE OLIVEIRA, PT

## 2023-01-04 ENCOUNTER — CLINICAL SUPPORT (OUTPATIENT)
Dept: REHABILITATION | Facility: HOSPITAL | Age: 33
End: 2023-01-04
Payer: COMMERCIAL

## 2023-01-04 DIAGNOSIS — M25.671 DECREASED RANGE OF MOTION OF RIGHT ANKLE: Primary | ICD-10-CM

## 2023-01-04 DIAGNOSIS — R29.898 DECREASED STRENGTH OF LOWER EXTREMITY: ICD-10-CM

## 2023-01-04 DIAGNOSIS — R26.9 GAIT ABNORMALITY: ICD-10-CM

## 2023-01-04 PROCEDURE — 97110 THERAPEUTIC EXERCISES: CPT

## 2023-01-04 PROCEDURE — 97112 NEUROMUSCULAR REEDUCATION: CPT

## 2023-01-04 PROCEDURE — 97140 MANUAL THERAPY 1/> REGIONS: CPT

## 2023-01-04 NOTE — PROGRESS NOTES
Physical Therapy Daily Treatment Note     [facility] Number[facility] [id number]    Therapy Diagnosis:   Encounter Diagnoses   Name Primary?    Decreased range of motion of right ankle Yes    Decreased strength of lower extremity     Gait abnormality          Physician: Aayush Amos MD    Visit Date: 1/4/2023     Physician Orders: PT Eval and Treat   Medical Diagnosis from Referral: Subluxation of peroneal tendon of right foot [S93.331A], Ankle impingement syndrome, right [M25.871]  Evaluation Date: 12/9/2022  Authorization Period Expiration: 12/6/23  Plan of Care Expiration: 5/29/23  Visit # / Visits authorized: 1/ 20 (7 total)      Time In: 1:14 PM  Time Out: 2:11 PM  Total Billable Time: 57 minutes    Precautions: Standard  PROCEDURE:  DOS: 12/6/22 4 weeks 1 day s/p  Right ankle arthroscopy and debridement with removal of loose body  ORIF of syndesmosis  SPR reconstruction  Peroneus longus repair  Debridement of peroneus brevis tenosynovitis  Manual stress examination of ankle  POST-OP PLAN:  WB Status:  NWB X 6 weeks. 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   Physical therapy: to begin Friday  DVT prophylaxis: full dose ASA while NWB  Antibiotics: n/a  Pain control: given percocet and anti-inflammatories  Dressing: placed in boot. Do not get sutures wet until they have been removed  If questions call [phone] and ask for Dr. Ruma Rodriguez.  Disposition: Follow-up with me as scheduled.  XR at f/u NWB 3 views ankle     NWB for 6 weeks, no eversion for 6 weeks, passive and active range of motion  Dr. Rodriguez protocol  Subjective     Pt reports: ankle is feeling good. Just feel tightness along my achilles   He was compliant with home exercise program  Response to previous treatment: ongoing  Functional change: ongoing    Pain: 0/10  Location: right ankles     Objective   Ankle Passive Range of Motion:   Ankle Right Left   DF (knee extended) 4 10   DF  (knee bent) 4 10   Plantarflexion 45 60   Inversion N/t 35   Eversion N/t 25     Taras received therapeutic exercises to develop strength, endurance, ROM, and flexibility for 15 minutes including:  Stationary bike 10' to improve CV endurance and tissue extensibility  Gastroc/soleus stretch 30s x5 each  Ankle circles (no eversion): 30x      Not today  SLR 3x fatigue 7.5#  SL hip ABD 7.5# 3x fatigue  Seated heel slide PROM into DF x30          Taras received the following manual therapy techniques: Joint mobilizations and Soft tissue Mobilization were applied to the: R ankle for 15 minutes, including:  PROM within post-op protocol parameters  Grade II/III a/p talocrural joint mobs  Grade II/III subtalar joint mobilizations  Scar mobilization    Taras participated in neuromuscular re-education activities to improve: Balance, Coordination, Kinesthetic, Sense, and Proprioception for 27 minutes. The following activities were included:  BFR applied for the following at 80% LOP reps/sets: 15/15/15  -Black TB DF/PF  -SLR  -SL hip ABD   -DL bridges  -LAQ 10#    Not today  Toe yoga 30x  Towel curls 30x   Seated DL calf raise 3x10    Taras participated in dynamic functional therapeutic activities to improve functional performance for 0 minutes, including:      Not today:  1000m 1:30  ratio normal:high tempo row machine: 5': with RLE on slider boards to improve CV endurance  [place] twist 10# MB 25x each  Seated hassan ropes for CV endurance: 30s x4      Taras participated in gait training to improve functional mobility and safety for 0  minutes, includin    Home Exercises Provided and Patient Education Provided     Education provided:   - HEP, POC, precautions, recovery timeline    Written Home Exercises Provided: Patient instructed to cont prior HEP.  Exercises were reviewed and Taras was able to demonstrate them prior to the end of the session.  Taras demonstrated good  understanding of the  education provided.     See EMR under Patient Instructions for exercises provided prior visit.    Assessment   Patient completed session as noted above with continued improvements noted into DF/PF but continues to be restricted at end range as expected at this stage of rehab 2/2 strict ROM precautions. Continued with previous plan for max protection of ROM at this stage of rehab in order to protect repair with no active/passive inversion/eversion. Overall progressing well at this time in regards to DF/PF ROM. Continues to be challenged appropriately with proximal hip and core strengthening demo'ing therapeutic effect. Will continue to progress as tolerated within post-op protocol parameters. Plan to initiate BFR training next session to improve hypertrophy and muscular strength despite NWB status at this time    Taras Is progressing well towards his goals.   Pt prognosis is Excellent.     Pt will continue to benefit from skilled outpatient physical therapy to address the deficits listed in the problem list box on initial evaluation, provide pt/family education and to maximize pt's level of independence in the home and community environment.     Pt's spiritual, cultural and educational needs considered and pt agreeable to plan of care and goals.     Anticipated barriers to physical therapy: none    Goals: Goals:  Short Term Goals (4 Weeks):  1.  Pts pain level decreased to 75% or greater for with  for restoring functional mobility and ADL.  2. Pts AROM in R DF increased by 10 degrees to restore functional mobility and ADL  3. Pts strength in the RLE increased by one grade to facilitate improved active mobility and functional stability  4. Pt will be independent with HEP for self-management.   5. Pt to exhibit dynamic stability on the RLE for stable functional mobility and gait.   6. Pt to demonstrate symmetrical weight bearing w/o pain to improve gait pattern with assistive device      Long term goals (24  weeks)  1.pt will pass return to running criteria and hopping progression for return to running  2. pt will demonstrate at least 95% LSI with figure 8, lateral, and square hop testing for decreased reinjury risk  3. pt will demonstrate appropriate SL calf raise endurance compared to age norms with incline calf raise test for improved ankle function  4. Patient will demonstrate appropriate mechanics with sport specific activities for full return to sport  5. Patient will improve FOTO score to </= TBD% limited to decrease perceived limitation with mobility.    Plan     Plan of care Certification: 12/9/2022 to 5/29/23.     Outpatient Physical Therapy 2 times weekly for 24 weeks to include the following interventions: Electrical Stimulation NMES, Gait Training, Manual Therapy, Moist Heat/ Ice, Neuromuscular Re-ed, Patient Education, Self Care, Therapeutic Activities, and Therapeutic Exercise.     BRITTNEE OLIVEIRA, PT

## 2023-01-09 ENCOUNTER — CLINICAL SUPPORT (OUTPATIENT)
Dept: REHABILITATION | Facility: HOSPITAL | Age: 33
End: 2023-01-09
Payer: COMMERCIAL

## 2023-01-09 DIAGNOSIS — R26.9 GAIT ABNORMALITY: ICD-10-CM

## 2023-01-09 DIAGNOSIS — R29.898 DECREASED STRENGTH OF LOWER EXTREMITY: ICD-10-CM

## 2023-01-09 DIAGNOSIS — M25.671 DECREASED RANGE OF MOTION OF RIGHT ANKLE: Primary | ICD-10-CM

## 2023-01-09 PROCEDURE — 97110 THERAPEUTIC EXERCISES: CPT

## 2023-01-09 PROCEDURE — 97112 NEUROMUSCULAR REEDUCATION: CPT

## 2023-01-09 PROCEDURE — 97140 MANUAL THERAPY 1/> REGIONS: CPT

## 2023-01-09 NOTE — PROGRESS NOTES
Physical Therapy Daily Treatment Note     [facility] Number: [id number]    Therapy Diagnosis:   Encounter Diagnoses   Name Primary?    Decreased range of motion of right ankle Yes    Decreased strength of lower extremity     Gait abnormality            Physician: Aayush Amos MD    Visit Date: 1/9/2023     Physician Orders: PT Eval and Treat   Medical Diagnosis from Referral: Subluxation of peroneal tendon of right foot [S93.331A], Ankle impingement syndrome, right [M25.871]  Evaluation Date: 12/9/2022  Authorization Period Expiration: 12/6/23  Plan of Care Expiration: 5/29/23  Visit # / Visits authorized: 2/ 20 (7 total)      Time In: 1:10 PM  Time Out: 3:11 PM  Total Billable Time: 61 minutes    Precautions: Standard  PROCEDURE:  DOS: 12/6/22 4 weeks 1 day s/p  Right ankle arthroscopy and debridement with removal of loose body  ORIF of syndesmosis  SPR reconstruction  Peroneus longus repair  Debridement of peroneus brevis tenosynovitis  Manual stress examination of ankle  POST-OP PLAN:  WB Status:  NWB X 6 weeks. 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   Physical therapy: to begin Friday  DVT prophylaxis: full dose ASA while NWB  Antibiotics: n/a  Pain control: given percocet and anti-inflammatories  Dressing: placed in boot. Do not get sutures wet until they have been removed  If questions call [phone] and ask for Dr. Ruma Rodriguez.  Disposition: Follow-up with me as scheduled.  XR at f/u NWB 3 views ankle     NWB for 6 weeks, no eversion for 6 weeks, passive and active range of motion  Dr. Rodriguez protocol  Subjective     Pt reports: No complaints upon arrival. Missed Friday due to going to [place]   He was compliant with home exercise program  Response to previous treatment: improved ROM  Functional change: ongoing    Pain: 0/10  Location: right ankles     Objective   Ankle Passive Range of Motion:   Ankle Right Left   DF  (knee extended) 4 10   DF (knee bent) 4 10   Plantarflexion 45 60   Inversion N/t 35   Eversion N/t 25     Taras received therapeutic exercises to develop strength, endurance, ROM, and flexibility for 16 minutes including:  Stationary bike 10' to improve CV endurance and tissue extensibility  Gastroc/soleus stretch 30s x5 each  Ankle circles (no eversion): 30x      Not today  SLR 3x fatigue 7.5#  SL hip ABD 7.5# 3x fatigue  Seated heel slide PROM into DF x30          Taras received the following manual therapy techniques: Joint mobilizations and Soft tissue Mobilization were applied to the: R ankle for 15 minutes, including:  PROM within post-op protocol parameters  Grade III/III a/p talocrural joint mobs  Grade III/IV subtalar joint mobilizations  Scar mobilization  IASTM to scar and surrounding tissue    Taras participated in neuromuscular re-education activities to improve: Balance, Coordination, Kinesthetic, Sense, and Proprioception for 30 minutes. The following activities were included:  BFR applied for the following at 80% LOP reps/sets: 15/15/15  -seated calf raise   -SLR 7.5#  -modified side plank + clamshell GTB  -SL bridges  -LAQ 10#    Not today  Toe yoga 30x  Towel curls 30x   Seated DL calf raise 3x10    Taras participated in dynamic functional therapeutic activities to improve functional performance for 0 minutes, including:      Not today:  1000m 1:30  ratio normal:high tempo row machine: 5': with RLE on slider boards to improve CV endurance  [place] twist 10# MB 25x each  Seated hassan ropes for CV endurance: 30s x4      Taras participated in gait training to improve functional mobility and safety for 0  minutes, includin    Home Exercises Provided and Patient Education Provided     Education provided:   - HEP, POC, precautions, recovery timeline    Written Home Exercises Provided: Patient instructed to cont prior HEP.  Exercises were reviewed and Taras was able to  demonstrate them prior to the end of the session.  Taras demonstrated good  understanding of the education provided.     See EMR under Patient Instructions for exercises provided prior visit.    Assessment   Patient completed session as noted above with full PF/DF ROM noted upon arrival. Pt reported TTP along incision sites to IASTM performed to address scar tissue formation with improvement reported by patient. Continued with BFR training to augment hypertrophy and strength at this stage where we are unable to progress loading due to WB status. Progressing well at this time. Will continue to progress per protocol      Taras Is progressing well towards his goals.   Pt prognosis is Excellent.     Pt will continue to benefit from skilled outpatient physical therapy to address the deficits listed in the problem list box on initial evaluation, provide pt/family education and to maximize pt's level of independence in the home and community environment.     Pt's spiritual, cultural and educational needs considered and pt agreeable to plan of care and goals.     Anticipated barriers to physical therapy: none    Goals: Goals:  Short Term Goals (4 Weeks):  1.  Pts pain level decreased to 75% or greater for with  for restoring functional mobility and ADL.  2. Pts AROM in R DF increased by 10 degrees to restore functional mobility and ADL  3. Pts strength in the RLE increased by one grade to facilitate improved active mobility and functional stability  4. Pt will be independent with HEP for self-management.   5. Pt to exhibit dynamic stability on the RLE for stable functional mobility and gait.   6. Pt to demonstrate symmetrical weight bearing w/o pain to improve gait pattern with assistive device      Long term goals (24 weeks)  1.pt will pass return to running criteria and hopping progression for return to running  2. pt will demonstrate at least 95% LSI with figure 8, lateral, and square hop testing for decreased  reinjury risk  3. pt will demonstrate appropriate SL calf raise endurance compared to age norms with incline calf raise test for improved ankle function  4. Patient will demonstrate appropriate mechanics with sport specific activities for full return to sport  5. Patient will improve FOTO score to </= TBD% limited to decrease perceived limitation with mobility.    Plan     Plan of care Certification: 12/9/2022 to 5/29/23.     Outpatient Physical Therapy 2 times weekly for 24 weeks to include the following interventions: Electrical Stimulation NMES, Gait Training, Manual Therapy, Moist Heat/ Ice, Neuromuscular Re-ed, Patient Education, Self Care, Therapeutic Activities, and Therapeutic Exercise.     BRITTNEE OLIVEIRA, PT

## 2023-01-18 ENCOUNTER — CLINICAL SUPPORT (OUTPATIENT)
Dept: REHABILITATION | Facility: HOSPITAL | Age: 33
End: 2023-01-18
Payer: COMMERCIAL

## 2023-01-18 DIAGNOSIS — R29.898 DECREASED STRENGTH OF LOWER EXTREMITY: ICD-10-CM

## 2023-01-18 DIAGNOSIS — R26.9 GAIT ABNORMALITY: ICD-10-CM

## 2023-01-18 DIAGNOSIS — M25.671 DECREASED RANGE OF MOTION OF RIGHT ANKLE: Primary | ICD-10-CM

## 2023-01-18 PROCEDURE — 97530 THERAPEUTIC ACTIVITIES: CPT

## 2023-01-18 PROCEDURE — 97112 NEUROMUSCULAR REEDUCATION: CPT

## 2023-01-18 PROCEDURE — 97140 MANUAL THERAPY 1/> REGIONS: CPT

## 2023-01-18 PROCEDURE — 97110 THERAPEUTIC EXERCISES: CPT

## 2023-01-18 NOTE — PROGRESS NOTES
Physical Therapy Daily Treatment Note     Name: [facility] Number: [id number]    Therapy Diagnosis:   Encounter Diagnoses   Name Primary?    Decreased range of motion of right ankle Yes    Decreased strength of lower extremity     Gait abnormality        Physician: Aayush Amos MD    Visit Date: 1/18/2023     Physician Orders: PT Eval and Treat   Medical Diagnosis from Referral: Subluxation of peroneal tendon of right foot [S93.331A], Ankle impingement syndrome, right [M25.871]  Evaluation Date: 12/9/2022  Authorization Period Expiration: 12/6/23  Plan of Care Expiration: 5/29/23  Visit # / Visits authorized: 3/ 20 (9 total)      Time In: 1:19 PM  Time Out: 2:18 PM  Total Billable Time: 59 minutes    Precautions: Standard  PROCEDURE:  DOS: 12/6/22 6 weeks 1 day s/p  Right ankle arthroscopy and debridement with removal of loose body  ORIF of syndesmosis  SPR reconstruction  Peroneus longus repair  Debridement of peroneus brevis tenosynovitis  Manual stress examination of ankle  POST-OP PLAN:  WB Status:  NWB X 6 weeks. 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   Physical therapy: to begin Friday  DVT prophylaxis: full dose ASA while NWB  Antibiotics: n/a  Pain control: given percocet and anti-inflammatories  Dressing: placed in boot. Do not get sutures wet until they have been removed  If questions call [phone] and ask for Dr. Ruma Rodriguez.  Disposition: Follow-up with me as scheduled.  XR at f/u NWB 3 views ankle     NWB for 6 weeks, no eversion for 6 weeks, passive and active range of motion  Dr. Rodriguez protocol  Subjective     Pt reports: Just had f/u with Dr. Rodriguez and she was pleased with my progress. She said I may even be ahead of schedule. She said I can start walking WBAT with an ankle brace or boot    He was compliant with home exercise program  Response to previous treatment: improved ROM  Functional change: able to ambulate  WBAT    Pain: 0/10  Location: right ankles     Objective   Ankle Passive Range of Motion:   Ankle Right Left   DF (knee extended) 4 10   DF (knee bent) 10 10   Plantarflexion 45 60   Inversion 25 35   Eversion 15 25     Observation:  Gait: mildly antalgic, early heel rise on RLE, decreased stance time on RLE    Double leg calf raise: increased calcaneal eversion on RLE, decreased height  Single leg calf raise: unable on RLE    CKC DF ROM  Right: 25 deg  Left: 36 deg    Taras received therapeutic exercises to develop strength, endurance, ROM, and flexibility for 20 minutes including:  Stationary bike 10' to improve CV endurance and tissue extensibility  Objective testing: 10'  Self DF mobilization with purple power band 25x 5s holds  2-1 calf raise isometric holds 45s holds x 2' rest  Superset: resisted lateral steps GTB at midfoot x 5 laps    Next  Slant board: gastroc/soleus stretch 30s x5 each    Not today  SLR 3x fatigue 7.5#  SL hip ABD 7.5# 3x fatigue  Seated heel slide PROM into DF x30    Taras received the following manual therapy techniques: Joint mobilizations and Soft tissue Mobilization were applied to the: R ankle for 8 minutes, including:  Grade III/IV a/p talocrural joint mobs  Grade III/IV subtalar joint mobilizations  Grade V HVLA talocrural distraction manipulation    Not today:  Scar mobilization  IASTM to scar and surrounding tissue    Taras participated in neuromuscular re-education activities to improve: Balance, Coordination, Kinesthetic, Sense, and Proprioception for 11 minutes. The following activities were included:  SLS with 6# MB toss 30s x4  Standing foot doming holds 5s holds x10  Sneaky lunge level 1 5s holds x10B        Next:  Sneaky lunge level 2    Not today  BFR applied for the following at 80% LOP reps/sets: 30/15/15/15  seated calf raise   SLR 7.5#  modified side plank + clamshell GTB  SL bridges  LAQ 10#  Toe yoga 30x  Towel curls 30x   Seated DL calf raise  3x10    Taras participated in dynamic functional therapeutic activities to improve functional performance for 20 minutes, including:  Alter-G gait training @ 60%,65%,70% 75% BW 5' intervals         Home Exercises Provided and Patient Education Provided     Education provided:   - updated HEP, POC, precautions, recovery timeline    Written Home Exercises Provided: Patient instructed to cont prior HEP.  Exercises were reviewed and Taras was able to demonstrate them prior to the end of the session.  Taras demonstrated good  understanding of the education provided.     See EMR under Patient Instructions for exercises provided prior visit.    Assessment   Re-assessment performed today 2/2 patient receiving clearance from referring MD to begin WBAT. Upon re-assessment patient continues to demo ROM deficits most notably into DF 2/2 joint mobility and soft tissue restrictions. Majority of session today focused towards improving functional CKC DF mobility, NM re-ed of ankle stabilizing musculature, and normalizing gait mechanics. Pt reported tightness/discomfort along distal achilles during calf raise assessment likely due to prolonged period of immobilization. Introduced isometric loading to calf musculature to address progressive tendon loading to avoid development of achilles tendinopathy. Pt demonstrates mild gait abnormalities 2/2 continues deficits mentioned above so incorporated alter-G training to normalize gait mechanics at reduced body weight. Initially demonstrated decreased stance time and WB through RLE on gait analysis feature of alter-G but was able to self correct with time. 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   Pt prognosis is Excellent.     Pt will continue to benefit from skilled outpatient physical therapy to address the deficits listed in  the problem list box on initial evaluation, provide pt/family education and to maximize pt's level of independence in the home and community environment.     Pt's spiritual, cultural and educational needs considered and pt agreeable to plan of care and goals.     Anticipated barriers to physical therapy: none    Goals: Goals:  Short Term Goals (4 Weeks):  1.  Pts pain level decreased to 75% or greater for with  for restoring functional mobility and ADL.  2. Pts AROM in R DF increased by 10 degrees to restore functional mobility and ADL  3. Pts strength in the RLE increased by one grade to facilitate improved active mobility and functional stability  4. Pt will be independent with HEP for self-management.   5. Pt to exhibit dynamic stability on the RLE for stable functional mobility and gait.   6. Pt to demonstrate symmetrical weight bearing w/o pain to improve gait pattern with assistive device      Long term goals (24 weeks)  1.pt will pass return to running criteria and hopping progression for return to running  2. pt will demonstrate at least 95% LSI with figure 8, lateral, and square hop testing for decreased reinjury risk  3. pt will demonstrate appropriate SL calf raise endurance compared to age norms with incline calf raise test for improved ankle function  4. Patient will demonstrate appropriate mechanics with sport specific activities for full return to sport  5. Patient will improve FOTO score to </= TBD% limited to decrease perceived limitation with mobility.    Plan     Plan of care Certification: 12/9/2022 to 5/29/23.     Outpatient Physical Therapy 2 times weekly for 24 weeks to include the following interventions: Electrical Stimulation NMES, Gait Training, Manual Therapy, Moist Heat/ Ice, Neuromuscular Re-ed, Patient Education, Self Care, Therapeutic Activities, and Therapeutic Exercise.     BRITTNEE OLIVEIRA, PT

## 2023-01-20 ENCOUNTER — CLINICAL SUPPORT (OUTPATIENT)
Dept: REHABILITATION | Facility: HOSPITAL | Age: 33
End: 2023-01-20
Payer: COMMERCIAL

## 2023-01-20 DIAGNOSIS — R26.9 GAIT ABNORMALITY: ICD-10-CM

## 2023-01-20 DIAGNOSIS — R29.898 DECREASED STRENGTH OF LOWER EXTREMITY: ICD-10-CM

## 2023-01-20 DIAGNOSIS — M25.671 DECREASED RANGE OF MOTION OF RIGHT ANKLE: Primary | ICD-10-CM

## 2023-01-20 PROCEDURE — 97530 THERAPEUTIC ACTIVITIES: CPT

## 2023-01-20 PROCEDURE — 97110 THERAPEUTIC EXERCISES: CPT

## 2023-01-20 PROCEDURE — 97140 MANUAL THERAPY 1/> REGIONS: CPT

## 2023-01-20 PROCEDURE — 97112 NEUROMUSCULAR REEDUCATION: CPT

## 2023-01-20 NOTE — PROGRESS NOTES
Physical Therapy Daily Treatment Note     [facility] Number: [id number]    Therapy Diagnosis:   Encounter Diagnoses   Name Primary?    Decreased range of motion of right ankle Yes    Decreased strength of lower extremity     Gait abnormality        Physician: Aayush Amos MD    Visit Date: 1/20/2023     Physician Orders: PT Eval and Treat   Medical Diagnosis from Referral: Subluxation of peroneal tendon of right foot [S93.331A], Ankle impingement syndrome, right [M25.871]  Evaluation Date: 12/9/2022  Authorization Period Expiration: 12/6/23  Plan of Care Expiration: 5/29/23  Visit # / Visits authorized: 4/ 20 (10 total)      Time In: 1:10 PM  Time Out: 2:20 PM  Total Billable Time: 60 minutes    Precautions: Standard  PROCEDURE:  DOS: 12/6/22 6 weeks 3 day s/p  Right ankle arthroscopy and debridement with removal of loose body  ORIF of syndesmosis  SPR reconstruction  Peroneus longus repair  Debridement of peroneus brevis tenosynovitis  Manual stress examination of ankle  POST-OP PLAN:  WB Status:  NWB X 6 weeks. 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   Physical therapy: to begin Friday  DVT prophylaxis: full dose ASA while NWB  Antibiotics: n/a  Pain control: given percocet and anti-inflammatories  Dressing: placed in boot. Do not get sutures wet until they have been removed  If questions call [phone] and ask for Dr. Ruma Rodriguez.  Disposition: Follow-up with me as scheduled.  XR at f/u NWB 3 views ankle     NWB for 6 weeks, no eversion for 6 weeks, passive and active range of motion  Dr. Rodriguez protocol  Subjective     Pt reports: no complaints definitely a little sore after last session but no pain   He was compliant with home exercise program  Response to previous treatment: improved ROM  Functional change: able to ambulate WBAT    Pain: 0/10  Location: right ankles     Objective   Ankle Passive Range of Motion:   Ankle Right Left  "  DF (knee extended) 4 10   DF (knee bent) 10 10   Plantarflexion 45 60   Inversion 25 35   Eversion 15 25     Observation:  Gait: mildly antalgic, early heel rise on RLE, decreased stance time on RLE    Double leg calf raise: increased calcaneal eversion on RLE, decreased height  Single leg calf raise: unable on RLE    CKC DF ROM  Right: 25 deg  Left: 36 deg    Taras received therapeutic exercises to develop strength, endurance, ROM, and flexibility for 20 minutes including:  Stationary bike 10' to improve CV endurance and tissue extensibility  Self DF mobilization with purple power band 25x 5s holds  Slant board: gastroc/soleus stretch 30s x5 each    Not today:  2-1 calf raise isometric holds 45s holds x 2' rest  Superset: resisted lateral steps GTB at midfoot x 5 laps      Taras received the following manual therapy techniques: Joint mobilizations and Soft tissue Mobilization were applied to the: R ankle for 15 minutes, including:  Grade III/IV a/p talocrural joint mobs  Grade III/IV subtalar joint mobilizations  Grade V HVLA talocrural distraction manipulation  IASTM: gastroc/soleus, scar     Not today:  Scar mobilization  IASTM to scar and surrounding tissue    Taras participated in neuromuscular re-education activities to improve: Balance, Coordination, Kinesthetic, Sense, and Proprioception for 10 minutes. The following activities were included:  SLS with hassan rope slam 30sx4  Sneaky lunge level 2 5s holds x20B  24" SL box squat 3x10  12" step up 3x10     Next  Lateral step down 3x10    Not today  BFR applied for the following at 80% LOP reps/sets: 30/15/15/15  seated calf raise   SLR 7.5#  modified side plank + clamshell GTB  SL bridges  LAQ 10#  Toe yoga 30x  Towel curls 30x   Seated DL calf raise 3x10    Taras participated in dynamic functional therapeutic activities to improve functional performance for 25 minutes, including:  Alter-G gait training @ 65%,70% 75% , 80%, 85" BW @ 3.0 " MPH  5' each BW  30 single leg calf raises @ 50% BW        Home Exercises Provided and Patient Education Provided     Education provided:   - updated HEP, POC, precautions, recovery timeline    Written Home Exercises Provided: Patient instructed to cont prior HEP.  Exercises were reviewed and Taras was able to demonstrate them prior to the end of the session.  Taras demonstrated good  understanding of the education provided.     See EMR under Patient Instructions for exercises provided prior visit.    Assessment   Patient completed session as noted above with continued emphasis on normalizing DF ROM. Progressed CKC functional strengthening with cues for positive shin angle to improve carry-over of DF ROM. Patient with sig difficulty maintaining SL stability during single leg functional movements 2/2 balance and overall strength deficits. Continues to demonstrate significant strength deficits of ankle stabilizing musculature. Able to progress alter-G to 85% BW with good ability to maintain appropriate step length and WB through RLE on gait analysis feature of alter-G. Will continue to progress as tolerated in order to normalize RLE function. Will likely re-implement BFR in order to augment strength gains at this time with minimal loading    Taras Is progressing well towards his goals.   Pt prognosis is Excellent.     Pt will continue to benefit from skilled outpatient physical therapy to address the deficits listed in the problem list box on initial evaluation, provide pt/family education and to maximize pt's level of independence in the home and community environment.     Pt's spiritual, cultural and educational needs considered and pt agreeable to plan of care and goals.     Anticipated barriers to physical therapy: none    Goals: Goals:  Short Term Goals (4 Weeks):  1.  Pts pain level decreased to 75% or greater for with  for restoring functional mobility and ADL.  2. Pts AROM in R DF increased by 10  degrees to restore functional mobility and ADL  3. Pts strength in the RLE increased by one grade to facilitate improved active mobility and functional stability  4. Pt will be independent with [facility] for self-management.   5. Pt to exhibit dynamic stability on the RLE for stable functional mobility and gait.   6. Pt to demonstrate symmetrical weight bearing w/o pain to improve gait pattern with assistive device      Long term goals (24 weeks)  1.pt will pass return to running criteria and hopping progression for return to running  2. pt will demonstrate at least 95% LSI with figure 8, lateral, and square hop testing for decreased reinjury risk  3. pt will demonstrate appropriate SL calf raise endurance compared to age norms with incline calf raise test for improved ankle function  4. Patient will demonstrate appropriate mechanics with sport specific activities for full return to sport  5. Patient will improve FOTO score to </= TBD% limited to decrease perceived limitation with mobility.    Plan     Plan of care Certification: 12/9/2022 to 5/29/23.     Outpatient Physical Therapy 2 times weekly for 24 weeks to include the following interventions: Electrical Stimulation NMES, Gait Training, Manual Therapy, Moist Heat/ Ice, Neuromuscular Re-ed, Patient Education, Self Care, Therapeutic Activities, and Therapeutic Exercise.     BRITTNEE OLIVEIRA, PT

## 2023-01-27 ENCOUNTER — CLINICAL SUPPORT (OUTPATIENT)
Dept: REHABILITATION | Facility: HOSPITAL | Age: 33
End: 2023-01-27
Payer: COMMERCIAL

## 2023-01-27 DIAGNOSIS — R29.898 DECREASED STRENGTH OF LOWER EXTREMITY: ICD-10-CM

## 2023-01-27 DIAGNOSIS — R26.9 GAIT ABNORMALITY: ICD-10-CM

## 2023-01-27 DIAGNOSIS — M25.671 DECREASED RANGE OF MOTION OF RIGHT ANKLE: Primary | ICD-10-CM

## 2023-01-27 PROCEDURE — 97112 NEUROMUSCULAR REEDUCATION: CPT

## 2023-01-27 PROCEDURE — 97110 THERAPEUTIC EXERCISES: CPT

## 2023-01-27 PROCEDURE — 97140 MANUAL THERAPY 1/> REGIONS: CPT

## 2023-01-27 NOTE — PROGRESS NOTES
Physical Therapy Daily Treatment Note     [facility] Number[facility] [id number]    Therapy Diagnosis:   Encounter Diagnoses   Name Primary?    Decreased range of motion of right ankle Yes    Decreased strength of lower extremity     Gait abnormality          Physician: Aayush Amos MD    Visit Date: 1/27/2023     Physician Orders: PT Eval and Treat   Medical Diagnosis from Referral: Subluxation of peroneal tendon of right foot [S93.331A], Ankle impingement syndrome, right [M25.871]  Evaluation Date: 12/9/2022  Authorization Period Expiration: 12/6/23  Plan of Care Expiration: 5/29/23  Visit # / Visits authorized: 5/ 20 (11 total)      Time In: 1:07 PM  Time Out: 2:07 PM  Total Billable Time: 60 minutes    Precautions: Standard  PROCEDURE:  DOS: 12/6/22 7 weeks 6 day s/p  Right ankle arthroscopy and debridement with removal of loose body  ORIF of syndesmosis  SPR reconstruction  Peroneus longus repair  Debridement of peroneus brevis tenosynovitis  Manual stress examination of ankle  POST-OP PLAN:  WB Status:  NWB X 6 weeks. 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   Physical therapy: to begin Friday  DVT prophylaxis: full dose ASA while NWB  Antibiotics: n/a  Pain control: given percocet and anti-inflammatories  Dressing: placed in boot. Do not get sutures wet until they have been removed  If questions call [phone] and ask for Dr. Ruma Rodriguez.  Disposition: Follow-up with me as scheduled.  XR at f/u NWB 3 views ankle     NWB for 6 weeks, no eversion for 6 weeks, passive and active range of motion  Dr. Rodriguez protocol  Subjective     Pt reports: ankle has been feeling looser. Been able to walk without the ankle brace or boot with no problems. Feel like I am starting to walk more normally with less limp   He was compliant with home exercise program  Response to previous treatment: improved ROM  Functional change: able to ambulate WBAT    Pain:  "0/10  Location: right ankles     Objective   Ankle Passive Range of Motion:   Ankle Right Left   DF (knee extended) 4 10   DF (knee bent) 10 10   Plantarflexion 45 60   Inversion 25 35   Eversion 15 25     Observation:  Gait: mildly antalgic, early heel rise on RLE, decreased stance time on RLE    Double leg calf raise: increased calcaneal eversion on RLE, decreased height  Single leg calf raise: unable on RLE    CKC DF ROM  Right: 25 deg  Left: 36 deg    Taras received therapeutic exercises to develop strength, endurance, ROM, and flexibility for 10 minutes including:  Watt bike 5' to improve CV endurance and tissue extensibility  Self DF mobilization with purple power band 25x 5s holds  Slant board: gastroc/soleus stretch 30s x5 each    Not today:  2-1 calf raise isometric holds 45s holds x 2' rest  Superset: resisted lateral steps GTB at midfoot x 5 laps      Taras received the following manual therapy techniques: Joint mobilizations and Soft tissue Mobilization were applied to the: R ankle for 10 minutes, including:  Grade III/IV a/p talocrural joint mobs  Grade III/IV subtalar joint mobilizations  Grade V HVLA talocrural distraction manipulation    Not today:  Scar mobilization  IASTM: gastroc/soleus, scar       Taras participated in neuromuscular re-education activities to improve: Balance, Coordination, Kinesthetic, Sense, and Proprioception for 40 minutes. The following activities were included:  BFR applied for the followin% LOP OKC, 60% LOP CKC reps sets: 30/15/15/15  -SL shuttle squat 3 cords  -Split squat: cue for + shin angle    Y balance: 2x6B  Single leg RDL 3x8 15# KB  Superset:   Incline DL calf raise 3x fatigue  SLS on airex with reactive football catch 30x 3    Next:  Lateral step down    Not today  SLS with hassan rope slam 30sx4  Sneaky lunge level 2 5s holds x20B  24" SL box squat 3x10  12" step up 3x10   seated calf raise   modified side plank + clamshell GTB    Taras " "participated in dynamic functional therapeutic activities to improve functional performance for 0 minutes, including:  Alter-G gait training @ 65%,70% 75% , 80%, 85" BW @ 3.0 MPH  5' each BW  30 single leg calf raises @ 50% BW        Home Exercises Provided and Patient Education Provided     Education provided:   - updated HEP, POC, precautions, recovery timeline    Written Home Exercises Provided: Patient instructed to cont prior HEP.  Exercises were reviewed and Taras was able to demonstrate them prior to the end of the session.  Taras demonstrated good  understanding of the education provided.     See EMR under Patient Instructions for exercises provided prior visit.    Assessment   Patient completed session as noted above with continued emphasis on normalizing DF ROM through CKC functional movement progressions. Patient demonstrated significant improvement to achieve + shin angle during split lunge. Introduced dynamic balance activity through Y balance with significant difficulty noted controlling eccentric control and multidirectional balance. Overall continues to demo restrictions at end range DF and balance deficits. Will continue to progress as tolerated in order to normalize RLE function. Will likely re-implement BFR in order to augment strength gains at this time with minimal loading    Taras Is progressing well towards his goals.   Pt prognosis is Excellent.     Pt will continue to benefit from skilled outpatient physical therapy to address the deficits listed in the problem list box on initial evaluation, provide pt/family education and to maximize pt's level of independence in the home and community environment.     Pt's spiritual, cultural and educational needs considered and pt agreeable to plan of care and goals.     Anticipated barriers to physical therapy: none    Goals: Goals:  Short Term Goals (4 Weeks):  1.  Pts pain level decreased to 75% or greater for with  for restoring " functional mobility and ADL.  2. Pts AROM in R DF increased by 10 degrees to restore functional mobility and ADL  3. Pts strength in the RLE increased by one grade to facilitate improved active mobility and functional stability  4. Pt will be independent with HEP for self-management.   5. Pt to exhibit dynamic stability on the RLE for stable functional mobility and gait.   6. Pt to demonstrate symmetrical weight bearing w/o pain to improve gait pattern with assistive device      Long term goals (24 weeks)  1.pt will pass return to running criteria and hopping progression for return to running  2. pt will demonstrate at least 95% LSI with figure 8, lateral, and square hop testing for decreased reinjury risk  3. pt will demonstrate appropriate SL calf raise endurance compared to age norms with incline calf raise test for improved ankle function  4. Patient will demonstrate appropriate mechanics with sport specific activities for full return to sport  5. Patient will improve FOTO score to </= TBD% limited to decrease perceived limitation with mobility.    Plan     Plan of care Certification: 12/9/2022 to 5/29/23.     Outpatient Physical Therapy 2 times weekly for 24 weeks to include the following interventions: Electrical Stimulation NMES, Gait Training, Manual Therapy, Moist Heat/ Ice, Neuromuscular Re-ed, Patient Education, Self Care, Therapeutic Activities, and Therapeutic Exercise.     BRITTNEE OLIVEIRA, PT

## 2023-02-01 ENCOUNTER — CLINICAL SUPPORT (OUTPATIENT)
Dept: REHABILITATION | Facility: HOSPITAL | Age: 33
End: 2023-02-01
Payer: COMMERCIAL

## 2023-02-01 DIAGNOSIS — R29.898 DECREASED STRENGTH OF LOWER EXTREMITY: ICD-10-CM

## 2023-02-01 DIAGNOSIS — R26.9 GAIT ABNORMALITY: ICD-10-CM

## 2023-02-01 DIAGNOSIS — M25.671 DECREASED RANGE OF MOTION OF RIGHT ANKLE: Primary | ICD-10-CM

## 2023-02-01 PROCEDURE — 97110 THERAPEUTIC EXERCISES: CPT

## 2023-02-01 PROCEDURE — 97112 NEUROMUSCULAR REEDUCATION: CPT

## 2023-02-01 PROCEDURE — 97140 MANUAL THERAPY 1/> REGIONS: CPT

## 2023-02-01 NOTE — PROGRESS NOTES
Physical Therapy Daily Treatment Note/Re-assessment/Progress note     [facility] Number: [id number]    Therapy Diagnosis:   Encounter Diagnoses   Name Primary?    Decreased range of motion of right ankle Yes    Decreased strength of lower extremity     Gait abnormality        Physician: Aayush mAos MD    Visit Date: 2/1/2023     Physician Orders: PT Eval and Treat   Medical Diagnosis from Referral: Subluxation of peroneal tendon of right foot [S93.331A], Ankle impingement syndrome, right [M25.871]  Evaluation Date: 12/9/2022  Authorization Period Expiration: 12/6/23  Plan of Care Expiration: 5/29/23  Visit # / Visits authorized: 6/ 20 (12 total)      Time In: 1:18 PM  Time Out: 12:17 PM  Total Billable Time: 59 minutes    Precautions: Standard  PROCEDURE:  DOS: 12/6/22 8 weeks 1 day s/p  Right ankle arthroscopy and debridement with removal of loose body  ORIF of syndesmosis  SPR reconstruction  Peroneus longus repair  Debridement of peroneus brevis tenosynovitis  Manual stress examination of ankle  POST-OP PLAN:  WB Status:  NWB X 6 weeks. 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   Physical therapy: to begin Friday  DVT prophylaxis: full dose ASA while NWB  Antibiotics: n/a  Pain control: given percocet and anti-inflammatories  Dressing: placed in boot. Do not get sutures wet until they have been removed  If questions call [phone] and ask for Dr. Ruma Rodriguez.  Disposition: Follow-up with me as scheduled.  XR at f/u NWB 3 views ankle     NWB for 6 weeks, no eversion for 6 weeks, passive and active range of motion  Dr. Rodriguez protocol  Subjective     Pt reports: Was sore following last session mostly in my calf. Felt like a good soreness from working out no pain.    He was compliant with home exercise program  Response to previous treatment: improved DF ROM  Functional change: improved gait mechanics    Pain: 0/10  Location: right ankles  "    Objective   Objective testin23  Ankle Passive Range of Motion:   Ankle Right Left   DF (knee extended) 8 8   DF (knee bent) 8 8   Plantarflexion 55 60   Inversion 30 35   Eversion 20 25     CKC DF ROM  Right: 32 deg  Left: 37 deg  5 deg deficit    Observation:  Gait: mildly antalgic, early heel rise on RLE, decreased stance time on RLE    Double leg calf raise: increased calcaneal eversion on RLE  Single leg calf raise height:   Right: 10 cm  Left: 10 cm    SL calf raise reps:  Right: 24 reps  Left: 30 reps    SLS Eyes Closed:   Right: 5 seconds  Left: 15 seconds        Taras received therapeutic exercises to develop strength, endurance, ROM, and flexibility for 20 minutes including:  Re-assessment: 15'  Self DF mobilization with purple power band 25x 5s holds  Single leg calf raise 3x fatige        Taras received the following manual therapy techniques: Joint mobilizations and Soft tissue Mobilization were applied to the: R ankle for 8 minutes, including:  Grade III/IV a/p talocrural joint mobs  Grade III/IV subtalar joint mobilizations  Grade V HVLA talocrural distraction manipulation    Not today:  Scar mobilization  IASTM: gastroc/soleus, scar       Taras participated in neuromuscular re-education activities to improve: Balance, Coordination, Kinesthetic, Sense, and Proprioception for 31 minutes. The following activities were included:  12" weighted forward step up 3x10 25# DB  Resisted lateral steps: 2 laps flat foot, 2 laps in PF  [place] split squat 4x8-10 25# KB front rack  Vasso stap 270 deg static holds 30s x3  Superset 90# sled push: emphasis on triple extension x 3 laps    Next:  Anterior step down  Standing clamshell     Not performed today:  BFR applied for the followin% LOP OKC, 60% LOP CKC reps sets: 30/15/15/15  -SL shuttle squat 3 cords  -Split squat: cue for + shin angle  Y balance: 2x6B  Single leg RDL 3x8 15# KB  SLS on airex with reactive football catch 30x 3  SLS " "with hassan rope slam 30sx4  Sneaky lunge level 2 5s holds x20B  24" SL box squat 3x10     Taras participated in dynamic functional therapeutic activities to improve functional performance for 0 minutes, including:        Home Exercises Provided and Patient Education Provided     Education provided:   - updated HEP, POC, precautions, recovery timeline    Written Home Exercises Provided: Patient instructed to cont prior HEP.  Exercises were reviewed and Taras was able to demonstrate them prior to the end of the session.  Taras demonstrated good  understanding of the education provided.     See EMR under Patient Instructions for exercises provided prior visit.    Assessment   Simeon completed session as noted above with additional objective measures performed today as re-assessment/progress report. Upon re-assessment, patient continues to demonstrate ROM restrictions 2/2 joint mobility and soft tissue restrictions most notable into DF ROM although significant improvement noted over past 2 weeks. Patient continues to have significant neuromuscular control deficits contributing to balance and aberrant movement patterns required for higher level functional activities. Patient completed session as noted above with continued emphasis on normalizing DF ROM through CKC functional movement progressions Patient continues to require verbal/tactile cues for positive shin angle but able to correct through reps. Intro'd rotational based single leg stability with visible muscle shaking 2/2 fatigue of RLE. Continues to demo significant strength/endurance deficits of RLE 2/2 prolonged immobilization period. 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    Taras Is progressing well towards his goals.   Pt prognosis is Excellent.     Pt will continue to benefit from skilled " outpatient physical therapy to address the deficits listed in the problem list box on initial evaluation, provide pt/family education and to maximize pt's level of independence in the home and community environment.     Pt's spiritual, cultural and educational needs considered and pt agreeable to plan of care and goals.     Anticipated barriers to physical therapy: none    Goals: Goals:  Short Term Goals (4 Weeks):  1.  Pts pain level decreased to 75% or greater for with  for restoring functional mobility and ADL. MET  2. Pts AROM in R DF increased by 10 degrees to restore functional mobility and ADL MET  3. Pts strength in the RLE increased by one grade to facilitate improved active mobility and functional stability MET  4. Pt will be independent with HEP for self-management.  MET  5. Pt to exhibit dynamic stability on the RLE for stable functional mobility and gait.  In progress  6. Pt to demonstrate symmetrical weight bearing w/o pain to improve gait pattern with assistive device  MET     Long term goals (24 weeks) All LTG in progress  1.pt will pass return to running criteria and hopping progression for return to running  2. pt will demonstrate at least 95% LSI with figure 8, lateral, and square hop testing for decreased reinjury risk  3. pt will demonstrate appropriate SL calf raise endurance compared to age norms with incline calf raise test for improved ankle function  4. Patient will demonstrate appropriate mechanics with sport specific activities for full return to sport  5. Patient will improve FOTO score to </= TBD% limited to decrease perceived limitation with mobility.    Plan   Continue to focus on ROM, NM control deficits, overall RLE strength and conditioning required for higher level functional activities    BRITTNEE OLIVEIRA, PT

## 2023-02-01 NOTE — PLAN OF CARE
Physical Therapy Daily Treatment Note/Re-assessment/Progress note     [facility] Number[facility] [id number]    Therapy Diagnosis:   Encounter Diagnoses   Name Primary?    Decreased range of motion of right ankle Yes    Decreased strength of lower extremity     Gait abnormality        Physician: Aayush Amos MD    Visit Date: 2/1/2023     Physician Orders: PT Eval and Treat   Medical Diagnosis from Referral: Subluxation of peroneal tendon of right foot [S93.331A], Ankle impingement syndrome, right [M25.871]  Evaluation Date: 12/9/2022  Authorization Period Expiration: 12/6/23  Plan of Care Expiration: 5/29/23  Visit # / Visits authorized: 6/ 20 (12 total)      Time In: 1:18 PM  Time Out: 12:17 PM  Total Billable Time: 59 minutes    Precautions: Standard  PROCEDURE:  DOS: 12/6/22 8 weeks 1 day s/p  Right ankle arthroscopy and debridement with removal of loose body  ORIF of syndesmosis  SPR reconstruction  Peroneus longus repair  Debridement of peroneus brevis tenosynovitis  Manual stress examination of ankle  POST-OP PLAN:  WB Status:  NWB X 6 weeks. 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   Physical therapy: to begin Friday  DVT prophylaxis: full dose ASA while NWB  Antibiotics: n/a  Pain control: given percocet and anti-inflammatories  Dressing: placed in boot. Do not get sutures wet until they have been removed  If questions call [phone] and ask for Dr. Ruma Rodriguez.  Disposition: Follow-up with me as scheduled.  XR at f/u NWB 3 views ankle     NWB for 6 weeks, no eversion for 6 weeks, passive and active range of motion  Dr. Rodriguez protocol  Subjective     Pt reports: Was sore following last session mostly in my calf. Felt like a good soreness from working out no pain.    He was compliant with home exercise program  Response to previous treatment: improved DF ROM  Functional change: improved gait mechanics    Pain: 0/10  Location: right ankles  "    Objective   Objective testin23  Ankle Passive Range of Motion:   Ankle Right Left   DF (knee extended) 8 8   DF (knee bent) 8 8   Plantarflexion 55 60   Inversion 30 35   Eversion 20 25     CKC DF ROM  Right: 32 deg  Left: 37 deg  5 deg deficit    Observation:  Gait: mildly antalgic, early heel rise on RLE, decreased stance time on RLE    Double leg calf raise: increased calcaneal eversion on RLE  Single leg calf raise height:   Right: 10 cm  Left: 10 cm    SL calf raise reps:  Right: 24 reps  Left: 30 reps    SLS Eyes Closed:   Right: 5 seconds  Left: 15 seconds        Taras received therapeutic exercises to develop strength, endurance, ROM, and flexibility for 20 minutes including:  Re-assessment: 15'  Self DF mobilization with purple power band 25x 5s holds  Single leg calf raise 3x fatige        Taras received the following manual therapy techniques: Joint mobilizations and Soft tissue Mobilization were applied to the: R ankle for 8 minutes, including:  Grade III/IV a/p talocrural joint mobs  Grade III/IV subtalar joint mobilizations  Grade V HVLA talocrural distraction manipulation    Not today:  Scar mobilization  IASTM: gastroc/soleus, scar       Taras participated in neuromuscular re-education activities to improve: Balance, Coordination, Kinesthetic, Sense, and Proprioception for 31 minutes. The following activities were included:  12" weighted forward step up 3x10 25# DB  Resisted lateral steps: 2 laps flat foot, 2 laps in PF  [place] split squat 4x8-10 25# KB front rack  Vasso stap 270 deg static holds 30s x3  Superset 90# sled push: emphasis on triple extension x 3 laps    Next:  Anterior step down  Standing clamshell     Not performed today:  BFR applied for the followin% LOP OKC, 60% LOP CKC reps sets: 30/15/15/15  -SL shuttle squat 3 cords  -Split squat: cue for + shin angle  Y balance: 2x6B  Single leg RDL 3x8 15# KB  SLS on airex with reactive football catch 30x 3  SLS " "with hassan rope slam 30sx4  Sneaky lunge level 2 5s holds x20B  24" SL box squat 3x10     Taras participated in dynamic functional therapeutic activities to improve functional performance for 0 minutes, including:        Home Exercises Provided and Patient Education Provided     Education provided:   - updated HEP, POC, precautions, recovery timeline    Written Home Exercises Provided: Patient instructed to cont prior HEP.  Exercises were reviewed and Taras was able to demonstrate them prior to the end of the session.  Taras demonstrated good  understanding of the education provided.     See EMR under Patient Instructions for exercises provided prior visit.    Assessment   Simeon completed session as noted above with additional objective measures performed today as re-assessment/progress report. Upon re-assessment, patient continues to demonstrate ROM restrictions 2/2 joint mobility and soft tissue restrictions most notable into DF ROM although significant improvement noted over past 2 weeks. Patient continues to have significant neuromuscular control deficits contributing to balance and aberrant movement patterns required for higher level functional activities. Patient completed session as noted above with continued emphasis on normalizing DF ROM through CKC functional movement progressions Patient continues to require verbal/tactile cues for positive shin angle but able to correct through reps. Intro'd rotational based single leg stability with visible muscle shaking 2/2 fatigue of RLE. Continues to demo significant strength/endurance deficits of RLE 2/2 prolonged immobilization period. 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    Taras Is progressing well towards his goals.   Pt prognosis is Excellent.     Pt will continue to benefit from skilled " outpatient physical therapy to address the deficits listed in the problem list box on initial evaluation, provide pt/family education and to maximize pt's level of independence in the home and community environment.     Pt's spiritual, cultural and educational needs considered and pt agreeable to plan of care and goals.     Anticipated barriers to physical therapy: none    Goals: Goals:  Short Term Goals (4 Weeks):  1.  Pts pain level decreased to 75% or greater for with  for restoring functional mobility and ADL. MET  2. Pts AROM in R DF increased by 10 degrees to restore functional mobility and ADL MET  3. Pts strength in the RLE increased by one grade to facilitate improved active mobility and functional stability MET  4. Pt will be independent with HEP for self-management.  MET  5. Pt to exhibit dynamic stability on the RLE for stable functional mobility and gait.  In progress  6. Pt to demonstrate symmetrical weight bearing w/o pain to improve gait pattern with assistive device  MET     Long term goals (24 weeks) All LTG in progress  1.pt will pass return to running criteria and hopping progression for return to running  2. pt will demonstrate at least 95% LSI with figure 8, lateral, and square hop testing for decreased reinjury risk  3. pt will demonstrate appropriate SL calf raise endurance compared to age norms with incline calf raise test for improved ankle function  4. Patient will demonstrate appropriate mechanics with sport specific activities for full return to sport  5. Patient will improve FOTO score to </= TBD% limited to decrease perceived limitation with mobility.    Plan   Continue to focus on ROM, NM control deficits, overall RLE strength and conditioning required for higher level functional activities    BRITTNEE OLIVEIRA, PT

## 2023-02-03 ENCOUNTER — CLINICAL SUPPORT (OUTPATIENT)
Dept: REHABILITATION | Facility: HOSPITAL | Age: 33
End: 2023-02-03
Payer: COMMERCIAL

## 2023-02-03 DIAGNOSIS — M25.671 DECREASED RANGE OF MOTION OF RIGHT ANKLE: Primary | ICD-10-CM

## 2023-02-03 DIAGNOSIS — R29.898 DECREASED STRENGTH OF LOWER EXTREMITY: ICD-10-CM

## 2023-02-03 DIAGNOSIS — R26.9 GAIT ABNORMALITY: ICD-10-CM

## 2023-02-03 PROCEDURE — 97530 THERAPEUTIC ACTIVITIES: CPT

## 2023-02-03 PROCEDURE — 97112 NEUROMUSCULAR REEDUCATION: CPT

## 2023-02-03 PROCEDURE — 97110 THERAPEUTIC EXERCISES: CPT

## 2023-02-03 PROCEDURE — 97140 MANUAL THERAPY 1/> REGIONS: CPT

## 2023-02-03 NOTE — PROGRESS NOTES
Physical Therapy Daily Treatment Note     [facility] Number: [id number]    Therapy Diagnosis:   Encounter Diagnoses   Name Primary?    Decreased range of motion of right ankle Yes    Decreased strength of lower extremity     Gait abnormality          Physician: Aayush Amos MD    Visit Date: 2/3/2023     Physician Orders: PT Eval and Treat   Medical Diagnosis from Referral: Subluxation of peroneal tendon of right foot [S93.331A], Ankle impingement syndrome, right [M25.871]  Evaluation Date: 2022  Authorization Period Expiration: 23  Plan of Care Expiration: 23  Visit # / Visits authorized:  (13 total)      Time In: 1:07 PM  Time Out: 2:05 PM  Total Billable Time: 58 minutes    Precautions: Standard  PROCEDURE:  DOS: 22 8 weeks 1 day s/p  Right ankle arthroscopy and debridement with removal of loose body  ORIF of syndesmosis  SPR reconstruction  Peroneus longus repair  Debridement of peroneus brevis tenosynovitis  Manual stress examination of ankle  POST-OP PLAN:  WB Status:  NWB X 6 weeks. Patient's goal is to be ready for May/ [employer] tryouts which I think is doable.   Physical therapy: to begin Friday  DVT prophylaxis: full dose ASA while NWB  Antibiotics: n/a  Pain control: given percocet and anti-inflammatories  Dressing: placed in boot. Do not get sutures wet until they have been removed  If questions call [phone] and ask for Dr. Ruma Rodriguez.  Disposition: Follow-up with me as scheduled.  XR at f/u NWB 3 views ankle     NWB for 6 weeks, no eversion for 6 weeks, passive and active range of motion  Dr. Rodriguez protocol  Subjective     Pt reports: hamstrings and calf were sore after last visit   He was compliant with home exercise program  Response to previous treatment: improved DF ROM  Functional change: improved gait mechanics    Pain: 0/10  Location: right ankles     Objective   Objective testin23  Ankle Passive Range of Motion:   Ankle  "Right Left   DF (knee extended) 8 8   DF (knee bent) 8 8   Plantarflexion 55 60   Inversion 30 35   Eversion 20 25     CKC DF ROM  Right: 32 deg  Left: 37 deg  5 deg deficit    Observation:  Gait: mildly antalgic, early heel rise on RLE, decreased stance time on RLE    Double leg calf raise: increased calcaneal eversion on RLE  Single leg calf raise height:   Right: 10 cm  Left: 10 cm    SL calf raise reps:  Right: 24 reps  Left: 30 reps          Taras received therapeutic exercises to develop strength, endurance, ROM, and flexibility for 10 minutes including:  Self DF mobilization with purple power band 25x 5s holds  Single leg calf raise 3x fatigue        Taras received the following manual therapy techniques: Joint mobilizations and Soft tissue Mobilization were applied to the: R ankle for 8 minutes, including:  Grade III/IV a/p talocrural joint mobs  Grade III/IV subtalar joint mobilizations  Grade V HVLA talocrural distraction manipulation    Not today:  Scar mobilization  IASTM: gastroc/soleus, scar       Taras participated in neuromuscular re-education activities to improve: Balance, Coordination, Kinesthetic, Sense, and Proprioception for 20 minutes. The following activities were included:  Y balance: 3x6B  6" anterior step down 3x12      12" weighted forward step up 3x10 25# DB  Resisted lateral steps: 2 laps flat foot, 2 laps in PF  [place]bian split squat 4x8-10 25# KB front rack  Vasso stap 270 deg static holds 30s x3  Superset 90# sled push: emphasis on triple extension x 3 laps    Next:  Anterior step down  Standing clamshell     Not performed today:  BFR applied for the followin% LOP OKC, 60% LOP CKC reps sets: /15/15/15  -SL shuttle squat 3 cords  -Split squat: cue for + shin angle  Y balance: 2x6B  Single leg RDL 3x8 15# KB  SLS on airex with reactive football catch 30x 3  SLS with hassan rope slam 30sx4  Sneaky lunge level 2 5s holds x20B  24" SL box squat 3x10     [place] " participated in dynamic functional therapeutic activities to improve functional performance for 20 minutes, including:  Alter-G walk jog intervals: 50-65% WB: 2:2 jog/walk ratio x 5 rounds      Home Exercises Provided and Patient Education Provided     Education provided:   - updated HEP, POC, precautions, recovery timeline    Written Home Exercises Provided: Patient instructed to cont prior HEP.  Exercises were reviewed and Taras was able to demonstrate them prior to the end of the session.  Taras demonstrated good  understanding of the education provided.     See EMR under Patient Instructions for exercises provided prior visit.    Assessment   Completed session as noted above with continued emphasis on loading into CKC DF and dynamic balance activities. Continues to demo hip dominant movement strategy with Y balance to offload quadriceps and difficulty maintaining SLS. Initiated alter-G body weight support treadmill jog/walk intervals to begin return to jogging progression and imporve CV endurance    Taras Is progressing well towards his goals.   Pt prognosis is Excellent.     Pt will continue to benefit from skilled outpatient physical therapy to address the deficits listed in the problem list box on initial evaluation, provide pt/family education and to maximize pt's level of independence in the home and community environment.     Pt's spiritual, cultural and educational needs considered and pt agreeable to plan of care and goals.     Anticipated barriers to physical therapy: none    Goals: Goals:  Short Term Goals (4 Weeks):  1.  Pts pain level decreased to 75% or greater for with  for restoring functional mobility and ADL. MET  2. Pts AROM in R DF increased by 10 degrees to restore functional mobility and ADL MET  3. Pts strength in the RLE increased by one grade to facilitate improved active mobility and functional stability MET  4. Pt will be independent with HEP for self-management.  MET  5. Pt  to exhibit dynamic stability on the RLE for stable functional mobility and gait.  In progress  6. Pt to demonstrate symmetrical weight bearing w/o pain to improve gait pattern with assistive device  MET     Long term goals (24 weeks) All LTG in progress  1.pt will pass return to running criteria and hopping progression for return to running  2. pt will demonstrate at least 95% LSI with figure 8, lateral, and square hop testing for decreased reinjury risk  3. pt will demonstrate appropriate SL calf raise endurance compared to age norms with incline calf raise test for improved ankle function  4. Patient will demonstrate appropriate mechanics with sport specific activities for full return to sport  5. Patient will improve FOTO score to </= TBD% limited to decrease perceived limitation with mobility.    Plan   Continue to focus on ROM, NM control deficits, overall RLE strength and conditioning required for higher level functional activities    BRITTNEE OLIVEIRA, PT

## 2023-02-08 ENCOUNTER — CLINICAL SUPPORT (OUTPATIENT)
Dept: REHABILITATION | Facility: HOSPITAL | Age: 33
End: 2023-02-08
Payer: COMMERCIAL

## 2023-02-08 DIAGNOSIS — R26.9 GAIT ABNORMALITY: ICD-10-CM

## 2023-02-08 DIAGNOSIS — M25.671 DECREASED RANGE OF MOTION OF RIGHT ANKLE: Primary | ICD-10-CM

## 2023-02-08 DIAGNOSIS — R29.898 DECREASED STRENGTH OF LOWER EXTREMITY: ICD-10-CM

## 2023-02-08 PROCEDURE — 97112 NEUROMUSCULAR REEDUCATION: CPT

## 2023-02-08 PROCEDURE — 97110 THERAPEUTIC EXERCISES: CPT

## 2023-02-08 PROCEDURE — 97140 MANUAL THERAPY 1/> REGIONS: CPT

## 2023-02-08 NOTE — PROGRESS NOTES
Physical Therapy Daily Treatment Note     [facility] Number: [id number]    Therapy Diagnosis:   Encounter Diagnoses   Name Primary?    Decreased range of motion of right ankle Yes    Decreased strength of lower extremity     Gait abnormality          Physician: Aayush Amos MD    Visit Date: 2/8/2023     Physician Orders: PT Eval and Treat   Medical Diagnosis from Referral: Subluxation of peroneal tendon of right foot [S93.331A], Ankle impingement syndrome, right [M25.871]  Evaluation Date: 12/9/2022  Authorization Period Expiration: 12/6/23  Plan of Care Expiration: 5/29/23  Visit # / Visits authorized: 7/ 20 (13 total)      Time In: 1:06 PM  Time Out: 2:05 PM  Total Billable Time: 59 minutes    Precautions: Standard  PROCEDURE:  DOS: 12/6/22 8 weeks 1 day s/p  Right ankle arthroscopy and debridement with removal of loose body  ORIF of syndesmosis  SPR reconstruction  Peroneus longus repair  Debridement of peroneus brevis tenosynovitis  Manual stress examination of ankle  POST-OP PLAN:  WB Status:  NWB X 6 weeks. 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   Physical therapy: to begin Friday  DVT prophylaxis: full dose ASA while NWB  Antibiotics: n/a  Pain control: given percocet and anti-inflammatories  Dressing: placed in boot. Do not get sutures wet until they have been removed  If questions call [phone] and ask for Dr. Ruma Rodriguez.  Disposition: Follow-up with me as scheduled.  XR at f/u NWB 3 views ankle     NWB for 6 weeks, no eversion for 6 weeks, passive and active range of motion  Dr. Rodriguez protocol  Subjective     Pt reports: achilles is pretty sore after last week and seeing yuval. Had to miss training with him yesterday because I just couldn't go   He was compliant with home exercise program  Response to previous treatment: improved DF ROM  Functional change: improved gait mechanics    Pain: 0/10  Location: right ankles  "    Objective   Objective testin23  Ankle Passive Range of Motion:   Ankle Right Left   DF (knee extended) 8 8   DF (knee bent) 8 8   Plantarflexion 55 60   Inversion 30 35   Eversion 20 25     CKC DF ROM  Right: 32 deg  Left: 37 deg  5 deg deficit    Observation:  Gait: mildly antalgic, early heel rise on RLE, decreased stance time on RLE    Double leg calf raise: increased calcaneal eversion on RLE  Single leg calf raise height:   Right: 10 cm  Left: 10 cm    SL calf raise reps:  Right: 24 reps  Left: 30 reps    Taras received therapeutic exercises to develop strength, endurance, ROM, and flexibility for 10 minutes including:  Self DF mobilization with purple power band 25x 5s holds  Single leg calf raise isometrics 45s holds x5         Taras received the following manual therapy techniques: Joint mobilizations and Soft tissue Mobilization were applied to the: R ankle for 15 minutes, including:  Grade III/IV a/p talocrural joint mobs  Grade III/IV subtalar joint mobilizations  Grade V HVLA talocrural distraction manipulation    Not today:  Scar mobilization  IASTM: gastroc/soleus, scar       Taras participated in neuromuscular re-education activities to improve: Balance, Coordination, Kinesthetic, Sense, and Proprioception for 34 minutes. The following activities were included:  6" anterior step down 3x12  Standing clamshell 3x12 GTB at knees   270 deg vasso strap rotational curtsy lunge 3x6  Walking lunges x 3 laps   Split lunge anterior MB slam: 4x6    Not today  12" weighted forward step up 3x10 25# DB  Resisted lateral steps: 2 laps flat foot, 2 laps in PF  [facility] split squat 4x8-10 25# KB front rack  Vasso stap 270 deg static holds 30s x3  Superset 90# sled push: emphasis on triple extension x 3 laps        Not performed today:  BFR applied for the followin% LOP OKC, 60% LOP CKC reps sets: /15/15/15  -SL shuttle squat 3 cords  -Split squat: cue for + shin angle  Y balance: " "2x6B  Single leg RDL 3x8 15# KB  SLS on airex with reactive football catch 30x 3  SLS with hassan rope slam 30sx4  Sneaky lunge level 2 5s holds x20B  24" SL box squat 3x10     Taras participated in dynamic functional therapeutic activities to improve functional performance for 0 minutes, including:  Alter-G walk jog intervals: 50-65% WB: 2:2 jog/walk ratio x 5 rounds      Home Exercises Provided and Patient Education Provided     Education provided:   - updated HEP, POC, precautions, recovery timeline    Written Home Exercises Provided: Patient instructed to cont prior HEP.  Exercises were reviewed and Taras was able to demonstrate them prior to the end of the session.  Taras demonstrated good  understanding of the education provided.     See EMR under Patient Instructions for exercises provided prior visit.    Assessment   Patient arrived to session with increased complaints of achilles discomfort consistent with likely achilles tendinopathy 2/2 increase in load last week with training. Focus of treatment session today focused towards decreasing irritability levels of achilles and continued focus on promoting CKC dorsiflexion through loaded functional movements. Able to complete session with min complaints of anterior pinching during walking lunges that improved with cues. Will re-assess Friday and progress as tolerated  Taras Is progressing well towards his goals.   Pt prognosis is Excellent.     Pt will continue to benefit from skilled outpatient physical therapy to address the deficits listed in the problem list box on initial evaluation, provide pt/family education and to maximize pt's level of independence in the home and community environment.     Pt's spiritual, cultural and educational needs considered and pt agreeable to plan of care and goals.     Anticipated barriers to physical therapy: none    Goals: Goals:  Short Term Goals (4 Weeks):  1.  Pts pain level decreased to 75% or greater " for with  for restoring functional mobility and ADL. MET  2. Pts AROM in R DF increased by 10 degrees to restore functional mobility and ADL MET  3. Pts strength in the RLE increased by one grade to facilitate improved active mobility and functional stability MET  4. Pt will be independent with HEP for self-management.  MET  5. Pt to exhibit dynamic stability on the RLE for stable functional mobility and gait.  In progress  6. Pt to demonstrate symmetrical weight bearing w/o pain to improve gait pattern with assistive device  MET     Long term goals (24 weeks) All LTG in progress  1.pt will pass return to running criteria and hopping progression for return to running  2. pt will demonstrate at least 95% LSI with figure 8, lateral, and square hop testing for decreased reinjury risk  3. pt will demonstrate appropriate SL calf raise endurance compared to age norms with incline calf raise test for improved ankle function  4. Patient will demonstrate appropriate mechanics with sport specific activities for full return to sport  5. Patient will improve FOTO score to </= TBD% limited to decrease perceived limitation with mobility.    Plan   Continue to focus on ROM, NM control deficits, overall RLE strength and conditioning required for higher level functional activities    BRITTNEE OLIVEIRA, PT

## 2023-02-13 ENCOUNTER — CLINICAL SUPPORT (OUTPATIENT)
Dept: REHABILITATION | Facility: HOSPITAL | Age: 33
End: 2023-02-13
Attending: PATHOLOGY
Payer: COMMERCIAL

## 2023-02-13 DIAGNOSIS — M25.671 DECREASED RANGE OF MOTION OF RIGHT ANKLE: Primary | ICD-10-CM

## 2023-02-13 DIAGNOSIS — R26.9 GAIT ABNORMALITY: ICD-10-CM

## 2023-02-13 DIAGNOSIS — R29.898 DECREASED STRENGTH OF LOWER EXTREMITY: ICD-10-CM

## 2023-02-13 PROCEDURE — 97110 THERAPEUTIC EXERCISES: CPT

## 2023-02-13 PROCEDURE — 97112 NEUROMUSCULAR REEDUCATION: CPT

## 2023-02-13 PROCEDURE — 97530 THERAPEUTIC ACTIVITIES: CPT

## 2023-02-13 PROCEDURE — 97140 MANUAL THERAPY 1/> REGIONS: CPT

## 2023-02-13 NOTE — PROGRESS NOTES
Physical Therapy Daily Treatment Note     [facility] [id number]    Therapy Diagnosis:   Encounter Diagnoses   Name Primary?    Decreased range of motion of right ankle Yes    Decreased strength of lower extremity     Gait abnormality          Physician: Aayush Amos MD    Visit Date: 2023     Physician Orders: PT Eval and Treat   Medical Diagnosis from Referral: Subluxation of peroneal tendon of right foot [S93.331A], Ankle impingement syndrome, right [M25.871]  Evaluation Date: 2022  Authorization Period Expiration: 23  Plan of Care Expiration: 23  Visit # / Visits authorized:  (13 total)    Time In: 1:05 PM  Time Out: 2:25 PM  Total Billable Time: 69 minutes    Precautions: Standard  PROCEDURE:  DOS: 22 8 weeks 1 day s/p  Right ankle arthroscopy and debridement with removal of loose body  ORIF of syndesmosis  SPR reconstruction  Peroneus longus repair  Debridement of peroneus brevis tenosynovitis  Manual stress examination of ankle  POST-OP PLAN:  WB Status:  NWB X 6 weeks. Patient's goal is to be ready for May/ [employer] tryouts which I think is doable.   Physical therapy: to begin Friday  DVT prophylaxis: full dose ASA while NWB  Antibiotics: n/a  Pain control: given percocet and anti-inflammatories  Dressing: placed in boot. Do not get sutures wet until they have been removed  If questions call [phone] and ask for Dr. Ruma Rodriguez.  Disposition: Follow-up with me as scheduled.  XR at f/u NWB 3 views ankle     NWB for 6 weeks, no eversion for 6 weeks, passive and active range of motion  Dr. Rodriguez protocol  Subjective     Pt reports: had to miss last week due to . Overall ankle feeling pretty good   He was compliant with home exercise program  Response to previous treatment: improved DF ROM  Functional change: improved gait mechanics    Pain: 0/10  Location: right ankles     Objective   Objective testin23  Ankle Passive Range  "of Motion:   Ankle Right Left   DF (knee extended) 8 8   DF (knee bent) 8 8   Plantarflexion 55 60   Inversion 30 35   Eversion 20 25     CKC DF ROM  Right: 32 deg  Left: 37 deg  5 deg deficit    Observation:  Gait: mildly antalgic, early heel rise on RLE, decreased stance time on RLE    Double leg calf raise: increased calcaneal eversion on RLE  Single leg calf raise height:   Right: 10 cm  Left: 10 cm    SL calf raise reps:  Right: 24 reps  Left: 30 reps    Taras received therapeutic exercises to develop strength, endurance, ROM, and flexibility for 14 minutes including:  Watt bike x 8 min to improve CV endurance and tissue extensibility  Self DF mobilization with purple power band 25x 5s holds    Not today  Single leg calf raise isometrics 45s holds x5         Taras received the following manual therapy techniques: Joint mobilizations and Soft tissue Mobilization were applied to the: R ankle for 15 minutes, including:  Grade III/IV a/p talocrural joint mobs  Grade III/IV subtalar joint mobilizations  Grade V HVLA talocrural distraction manipulation    Not today:  Scar mobilization  IASTM: gastroc/soleus, scar       Taras participated in neuromuscular re-education activities to improve: Balance, Coordination, Kinesthetic, Sense, and Proprioception for 20 minutes. The following activities were included:  [place] SS 30# front rack 4x6  Y balance 3x6B   SLS on mini bosu with reactive tennis ball catch 20x 3      6" anterior step down 3x12  Standing clamshell 3x12 GTB at knees   270 deg vasso strap rotational curtsy lunge 3x6  Walking lunges x 3 laps   Split lunge anterior MB slam: 4x6    Not today  12" weighted forward step up 3x10 25# DB  Resisted lateral steps: 2 laps flat foot, 2 laps in PF  [place] split squat 4x8-10 25# KB front rack  Vasso stap 270 deg static holds 30s x3  Superset 90# sled push: emphasis on triple extension x 3 laps        Not performed today:  BFR applied for the following: " "80% LOP OKC, 60% LOP CKC reps sets: 30/15/15/15  -SL shuttle squat 3 cords  -Split squat: cue for + shin angle  Y balance: 2x6B  Single leg RDL 3x8 15# KB  SLS on airex with reactive football catch 30x 3  SLS with hassan rope slam 30sx4  Sneaky lunge level 2 5s holds x20B  24" SL box squat 3x10     Taras participated in dynamic functional therapeutic activities to improve functional performance for 30 minutes, including:  Barbell back-squat 135# x12, 185# x10, 225# 2x8  Alter-G walk jog intervals: 75%-90% WB: 2:2 jog/walk ratio: 2 rounds at each BW stage      Home Exercises Provided and Patient Education Provided     Education provided:   - updated HEP, POC, precautions, recovery timeline    Written Home Exercises Provided: Patient instructed to cont prior HEP.  Exercises were reviewed and Taras was able to demonstrate them prior to the end of the session.  Taras demonstrated good  understanding of the education provided.     See EMR under Patient Instructions for exercises provided prior visit.    Assessment   Patient arrived to session with improvement in irritability levels compared to previous session. Progressed loading CKC functional strengthening to increase NM control and proprioceptive demand in particularly in single leg stance. Continues to have difficulty with Y balance 2/2 compensatory hip dominant movement patterns. Improved ability to achieve CKC DF during functional strengthening. Re-intro'd alter-G BW supported treadmill training up to 90% body weight with no complaints of pain. Will consider introducing beginner plyometrics next session depending on patients presentation.        Taras Is progressing well towards his goals.   Pt prognosis is Excellent.     Pt will continue to benefit from skilled outpatient physical therapy to address the deficits listed in the problem list box on initial evaluation, provide pt/family education and to maximize pt's level of independence in the home " and community environment.     Pt's spiritual, cultural and educational needs considered and pt agreeable to plan of care and goals.     Anticipated barriers to physical therapy: none    Goals: Goals:  Short Term Goals (4 Weeks):  1.  Pts pain level decreased to 75% or greater for with  for restoring functional mobility and ADL. MET  2. Pts AROM in R DF increased by 10 degrees to restore functional mobility and ADL MET  3. Pts strength in the RLE increased by one grade to facilitate improved active mobility and functional stability MET  4. Pt will be independent with HEP for self-management.  MET  5. Pt to exhibit dynamic stability on the RLE for stable functional mobility and gait.  In progress  6. Pt to demonstrate symmetrical weight bearing w/o pain to improve gait pattern with assistive device  MET     Long term goals (24 weeks) All LTG in progress  1.pt will pass return to running criteria and hopping progression for return to running  2. pt will demonstrate at least 95% LSI with figure 8, lateral, and square hop testing for decreased reinjury risk  3. pt will demonstrate appropriate SL calf raise endurance compared to age norms with incline calf raise test for improved ankle function  4. Patient will demonstrate appropriate mechanics with sport specific activities for full return to sport  5. Patient will improve FOTO score to </= TBD% limited to decrease perceived limitation with mobility.    Plan   Continue to focus on ROM, NM control deficits, overall RLE strength and conditioning required for higher level functional activities    BRITTNEE OLIVEIRA, PT

## 2023-03-01 ENCOUNTER — CLINICAL SUPPORT (OUTPATIENT)
Dept: REHABILITATION | Facility: HOSPITAL | Age: 33
End: 2023-03-01
Payer: COMMERCIAL

## 2023-03-01 DIAGNOSIS — M25.671 DECREASED RANGE OF MOTION OF RIGHT ANKLE: Primary | ICD-10-CM

## 2023-03-01 DIAGNOSIS — R29.898 DECREASED STRENGTH OF LOWER EXTREMITY: ICD-10-CM

## 2023-03-01 DIAGNOSIS — R26.9 GAIT ABNORMALITY: ICD-10-CM

## 2023-03-01 PROCEDURE — 97110 THERAPEUTIC EXERCISES: CPT

## 2023-03-01 PROCEDURE — 97530 THERAPEUTIC ACTIVITIES: CPT

## 2023-03-01 NOTE — PROGRESS NOTES
Physical Therapy Daily Treatment Note     [facility] [id number]    Therapy Diagnosis:   Encounter Diagnoses   Name Primary?    Decreased range of motion of right ankle Yes    Decreased strength of lower extremity     Gait abnormality            Physician: Aayush Amos MD    Visit Date: 3/1/2023     Physician Orders: PT Eval and Treat   Medical Diagnosis from Referral: Subluxation of peroneal tendon of right foot [S93.331A], Ankle impingement syndrome, right [M25.871]  Evaluation Date: 2022  Authorization Period Expiration: 23  Plan of Care Expiration: 23  Visit # / Visits authorized:  (13 total)    Time In: 2:00 PM  Time Out: 3:05 PM  Total Billable Time: 65 minutes    Precautions: Standard  PROCEDURE:  DOS: 22 12 weeks 1 day s/p  Right ankle arthroscopy and debridement with removal of loose body  ORIF of syndesmosis  SPR reconstruction  Peroneus longus repair  Debridement of peroneus brevis tenosynovitis  Manual stress examination of ankle  POST-OP PLAN:  WB Status:  NWB X 6 weeks. Patient's goal is to be ready for May/ [employer] tryouts which I think is doable.   Physical therapy: to begin Friday  DVT prophylaxis: full dose ASA while NWB  Antibiotics: n/a  Pain control: given percocet and anti-inflammatories  Dressing: placed in boot. Do not get sutures wet until they have been removed  If questions call [phone] and ask for Dr. Ruma Rodriguez.  Disposition: Follow-up with me as scheduled.  XR at f/u NWB 3 views ankle     NWB for 6 weeks, no eversion for 6 weeks, passive and active range of motion  Dr. Rodriguez protocol  Subjective     Pt reports: had to miss last week due to . Overall ankle feeling pretty good   He was compliant with home exercise program  Response to previous treatment: improved DF ROM  Functional change: improved gait mechanics    Pain: 0/10  Location: right ankles     Objective   Objective testing:  "3/1/23    Observation:  Gait: no major gait abnormalities     Ankle Passive Range of Motion:   Ankle Right Left   DF (knee extended) 8 8   DF (knee bent) 8 8   Plantarflexion 60 60   Inversion 35 35   Eversion 25 25     CKC DF ROM  Right: 36 deg  Left: 38 deg  2 deg deficit    SL calf raise reps:  Right: 24 reps  Left: 30 reps    Y Balance:   Right  Left  " difference   Anterior reach 59.5" 63.4" 4" deficit   Posteromedial 113.5" 115" 1.5" deficit   Posterolateral 105.5" 106.5 1" deficit       Taras received therapeutic exercises to develop strength, endurance, ROM, and flexibility for 20 minutes including:  Objective testing: 15'  Self DF mobilization with purple power band 25x 5s holds    Not today  Single leg calf raise isometrics 45s holds x5         Taras received the following manual therapy techniques: Joint mobilizations and Soft tissue Mobilization were applied to the: R ankle for 10 minutes, including:  Grade III/IV a/p talocrural joint mobs  Grade III/IV subtalar joint mobilizations  Grade V HVLA talocrural distraction manipulation    Not today:  Scar mobilization  IASTM: gastroc/soleus, scar       Taras participated in neuromuscular re-education activities to improve: Balance, Coordination, Kinesthetic, Sense, and Proprioception for 0 minutes. The following activities were included:      Not today  [place]ajik SS 30# front rack 4x6  Y balance 3x6B   SLS on mini bosu with reactive tennis ball catch 20x 3  6" anterior step down 3x12  Standing clamshell 3x12 GTB at knees   270 deg vasso strap rotational curtsy lunge 3x6  Walking lunges x 3 laps   Split lunge anterior MB slam: 4x6  12" weighted forward step up 3x10 25# DB  Resisted lateral steps: 2 laps flat foot, 2 laps in PF  [place] split squat 4x8-10 25# KB front rack  Vasso stap 270 deg static holds 30s x3  Superset 90# sled push: emphasis on triple extension x 3 laps  BFR applied for the followin% LOP OKC, 60% LOP CKC reps sets: " "30/15/15/15  -SL shuttle squat 3 cords  -Split squat: cue for + shin angle  Y balance: 2x6B  Single leg RDL 3x8 15# KB  SLS on airex with reactive football catch 30x 3  SLS with hassan rope slam 30sx4  Sneaky lunge level 2 5s holds x20B  24" SL box squat 3x10     Taras participated in dynamic functional therapeutic activities to improve functional performance for 35 minutes, including:  Hopping:  DL Hopping 3x30   DL Hopping fwd/back 3x30  DL hopping lateral 3x30   SL Hopping 3x20 ea   SL hopping fwd/back 3x20 ea   SL Hopping lateral 3x20 ea  Agility ladder training: high knees, laterals, in/outs, diagonals  Fan bike interval circuits: 20s normal 10s sprint x 5 rounds          Not today:  Barbell back-squat 135# x12, 185# x10, 225# 2x8  Alter-G walk jog intervals: 75%-90% WB: 2:2 jog/walk ratio: 2 rounds at each BW stage      Home Exercises Provided and Patient Education Provided     Education provided:   - updated HEP, POC, precautions, recovery timeline    Written Home Exercises Provided: Patient instructed to cont prior HEP.  Exercises were reviewed and Taras was able to demonstrate them prior to the end of the session.  Taras demonstrated good  understanding of the education provided.     See EMR under Patient Instructions for exercises provided prior visit.    Assessment   Re-assessment performed today 2/2 recent lapse in therapy appointments secondary to personal family issues. Re-assessment revealed near symmetrical CKC dorsiflexion ROM with only 2 deg difference and motor control/balance deficits noted on Y balance testing. Testing revealed adequate performance of < 6" deficit required to initiate pre-running activities.  During session today introduced beginner plyometrics and impact acceptance to assess tolerance to increased loading with plyometric and light agility activities necessary to begin level ground return to run program. Patient able to complete entirety of session with no complaints " of pain and good form throughout session. Provided Simeon with written copy of interval return to run/sprint program in order to have structured loading plan moving forward to decrease adverse effects of over-training. 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        Taras Is progressing well towards his goals.   Pt prognosis is Excellent.     Pt will continue to benefit from skilled outpatient physical therapy to address the deficits listed in the problem list box on initial evaluation, provide pt/family education and to maximize pt's level of independence in the home and community environment.     Pt's spiritual, cultural and educational needs considered and pt agreeable to plan of care and goals.     Anticipated barriers to physical therapy: none    Goals: Goals:  Short Term Goals (4 Weeks):  1.  Pts pain level decreased to 75% or greater for with  for restoring functional mobility and ADL. MET  2. Pts AROM in R DF increased by 10 degrees to restore functional mobility and ADL MET  3. Pts strength in the RLE increased by one grade to facilitate improved active mobility and functional stability MET  4. Pt will be independent with HEP for self-management.  MET  5. Pt to exhibit dynamic stability on the RLE for stable functional mobility and gait.  In progress  6. Pt to demonstrate symmetrical weight bearing w/o pain to improve gait pattern with assistive device  MET     Long term goals (24 weeks) All LTG in progress  1.pt will pass return to running criteria and hopping progression for return to running  2. pt will demonstrate at least 95% LSI with figure 8, lateral, and square hop testing for decreased reinjury risk  3. pt will demonstrate appropriate SL calf raise endurance compared to age norms with incline calf raise test for improved ankle function  4.  Patient will demonstrate appropriate mechanics with sport specific activities for full return to sport  5. Patient will improve FOTO score to </= TBD% limited to decrease perceived limitation with mobility.    Plan   Continue to focus on ROM, NM control deficits, overall RLE strength and conditioning required for higher level functional activities    BRITTNEE OLIVEIRA, PT

## 2023-03-08 ENCOUNTER — CLINICAL SUPPORT (OUTPATIENT)
Dept: REHABILITATION | Facility: HOSPITAL | Age: 33
End: 2023-03-08
Payer: COMMERCIAL

## 2023-03-08 DIAGNOSIS — R26.9 GAIT ABNORMALITY: ICD-10-CM

## 2023-03-08 DIAGNOSIS — R29.898 DECREASED STRENGTH OF LOWER EXTREMITY: ICD-10-CM

## 2023-03-08 DIAGNOSIS — M25.671 DECREASED RANGE OF MOTION OF RIGHT ANKLE: Primary | ICD-10-CM

## 2023-03-08 PROCEDURE — 97530 THERAPEUTIC ACTIVITIES: CPT

## 2023-03-08 PROCEDURE — 97112 NEUROMUSCULAR REEDUCATION: CPT

## 2023-03-08 NOTE — PROGRESS NOTES
Physical Therapy Daily Treatment Note     [facility] Number: [id number]    Therapy Diagnosis:   Encounter Diagnoses   Name Primary?    Decreased range of motion of right ankle Yes    Decreased strength of lower extremity     Gait abnormality        Physician: Aayush Amos MD    Visit Date: 3/8/2023     Physician Orders: PT Eval and Treat   Medical Diagnosis from Referral: Subluxation of peroneal tendon of right foot [S93.331A], Ankle impingement syndrome, right [M25.871]  Evaluation Date: 12/9/2022  Authorization Period Expiration: 12/6/23  Plan of Care Expiration: 5/29/23  Visit # / Visits authorized: 10/ 20 (17 total)    Time In: 1:55 PM  Time Out: 3:00 PM  Total Billable Time: 65 minutes    Precautions: Standard  PROCEDURE:  DOS: 12/6/22 12 weeks 1 day s/p  Right ankle arthroscopy and debridement with removal of loose body  ORIF of syndesmosis  SPR reconstruction  Peroneus longus repair  Debridement of peroneus brevis tenosynovitis  Manual stress examination of ankle  POST-OP PLAN:  WB Status:  NWB X 6 weeks. 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   Physical therapy: to begin Friday  DVT prophylaxis: full dose ASA while NWB  Antibiotics: n/a  Pain control: given percocet and anti-inflammatories  Dressing: placed in boot. Do not get sutures wet until they have been removed  If questions call [phone] and ask for Dr. Ruma Rodriguez.  Disposition: Follow-up with me as scheduled.  XR at f/u NWB 3 views ankle     NWB for 6 weeks, no eversion for 6 weeks, passive and active range of motion  Dr. Rodriguez protocol  Subjective     Pt reports: no complaints after last session with the RTR program   He was compliant with home exercise program  Response to previous treatment: improved DF ROM  Functional change: improved gait mechanics    Pain: 0/10  Location: right ankles     Objective   Objective testing: 3/1/23    Observation:  Gait: no major gait  "abnormalities     Ankle Passive Range of Motion:   Ankle Right Left   DF (knee extended) 8 8   DF (knee bent) 8 8   Plantarflexion 60 60   Inversion 35 35   Eversion 25 25     CKC DF ROM  Right: 36 deg  Left: 38 deg  2 deg deficit    SL calf raise reps:  Right: 24 reps  Left: 30 reps    Y Balance:   Right  Left  " difference   Anterior reach 59.5" 63.4" 4" deficit   Posteromedial 113.5" 115" 1.5" deficit   Posterolateral 105.5" 106.5 1" deficit       Taras received therapeutic exercises to develop strength, endurance, ROM, and flexibility for 0 minutes including:  Not today  Self DF mobilization with purple power band 25x 5s holds  Single leg calf raise isometrics 45s holds x5         Taras received the following manual therapy techniques: Joint mobilizations and Soft tissue Mobilization were applied to the: R ankle for 0 minutes, including:  Grade III/IV a/p talocrural joint mobs  Grade III/IV subtalar joint mobilizations  Grade V HVLA talocrural distraction manipulation    Not today:  Scar mobilization  IASTM: gastroc/soleus, scar       Taras participated in neuromuscular re-education activities to improve: Balance, Coordination, Kinesthetic, Sense, and Proprioception for 30 minutes. The following activities were included:  12" single leg depth drop 1x6  12" single leg depth drop 2x6 with reactive ball catch   Frontal plane 20# 3D strap lateral dynamic landings 2x6  Frontal plane 20# 3D strap lateral dynamic landings + counter jump 2x6      Not today  Kinyarwanda SS 30# front rack 4x6  Y balance 3x6B   SLS on mini bosu with reactive tennis ball catch 20x 3  6" anterior step down 3x12  Standing clamshell 3x12 GTB at knees   270 deg vasso strap rotational curtsy lunge 3x6  Walking lunges x 3 laps   Split lunge anterior MB slam: 4x6  12" weighted forward step up 3x10 25# DB  Resisted lateral steps: 2 laps flat foot, 2 laps in PF  Kinyarwanda split squat 4x8-10 25# KB front rack  Vasso stap 270 deg static " "holds 30s x3  Superset 90# sled push: emphasis on triple extension x 3 laps  BFR applied for the followin% LOP OKC, 60% LOP CKC reps sets: 30/15/15/15  -SL shuttle squat 3 cords  -Split squat: cue for + shin angle  Y balance: 2x6B  Single leg RDL 3x8 15# KB  SLS on airex with reactive football catch 30x 3  SLS with hassan rope slam 30sx4  Sneaky lunge level 2 5s holds x20B  24" SL box squat 3x10     Taras participated in dynamic functional therapeutic activities to improve functional performance for 35 minutes, including:  Elliptical 8' to improve CV endurance and tissue extensibility  Dynamic warmup: frankensteins, high kneels, butt kicks, power skips, A-skip, B-Skip  Hungarian SS plyo jumps 4x6   Sport cord resisted deceleration drill 4x6          Not today:  Hopping:  DL Hopping 3x30   DL Hopping fwd/back 3x30  DL hopping lateral 3x30   SL Hopping 3x20 ea   SL hopping fwd/back 3x20 ea   SL Hopping lateral 3x20 ea  Agility ladder training: high knees, laterals, in/outs, diagonals  Fan bike interval circuits: 20s normal 10s sprint x 5 rounds  Barbell back-squat 135# x12, 185# x10, 225# 2x8  Alter-G walk jog intervals: 75%-90% WB: 2:2 jog/walk ratio: 2 rounds at each BW stage      Home Exercises Provided and Patient Education Provided     Education provided:   - updated HEP, POC, precautions, recovery timeline    Written Home Exercises Provided: Patient instructed to cont prior HEP.  Exercises were reviewed and Taras was able to demonstrate them prior to the end of the session.  Taras demonstrated good  understanding of the education provided.     See EMR under Patient Instructions for exercises provided prior visit.    Assessment   Patient completed session as noted above with progressions made towards plyometric and deceleration based functional movement patterns. Patient continues to demo tendency to offload through hip dominant movement patterns to avoid loading quadriceps and into CKC " dorsiflexion. Decreased rate of force development noted on RLE. Overall tolerated treatment session well with no complaints of pain but sig fatigue and difficulty 2/2 lack of power production. 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        Taras Is progressing well towards his goals.   Pt prognosis is Excellent.     Pt will continue to benefit from skilled outpatient physical therapy to address the deficits listed in the problem list box on initial evaluation, provide pt/family education and to maximize pt's level of independence in the home and community environment.     Pt's spiritual, cultural and educational needs considered and pt agreeable to plan of care and goals.     Anticipated barriers to physical therapy: none    Goals: Goals:  Short Term Goals (4 Weeks):  1.  Pts pain level decreased to 75% or greater for with  for restoring functional mobility and ADL. MET  2. Pts AROM in R DF increased by 10 degrees to restore functional mobility and ADL MET  3. Pts strength in the RLE increased by one grade to facilitate improved active mobility and functional stability MET  4. Pt will be independent with HEP for self-management.  MET  5. Pt to exhibit dynamic stability on the RLE for stable functional mobility and gait.  In progress  6. Pt to demonstrate symmetrical weight bearing w/o pain to improve gait pattern with assistive device  MET     Long term goals (24 weeks) All LTG in progress  1.pt will pass return to running criteria and hopping progression for return to running  2. pt will demonstrate at least 95% LSI with figure 8, lateral, and square hop testing for decreased reinjury risk  3. pt will demonstrate appropriate SL calf raise endurance compared to age norms with incline calf raise test for improved ankle function  4. Patient will demonstrate  appropriate mechanics with sport specific activities for full return to sport  5. Patient will improve FOTO score to </= TBD% limited to decrease perceived limitation with mobility.    Plan   Continue to focus on ROM, NM control deficits, overall RLE strength and conditioning required for higher level functional activities    BRITTNEE OLIVEIRA, PT

## 2023-03-10 ENCOUNTER — CLINICAL SUPPORT (OUTPATIENT)
Dept: REHABILITATION | Facility: HOSPITAL | Age: 33
End: 2023-03-10
Payer: COMMERCIAL

## 2023-03-10 DIAGNOSIS — R29.898 DECREASED STRENGTH OF LOWER EXTREMITY: ICD-10-CM

## 2023-03-10 DIAGNOSIS — M25.671 DECREASED RANGE OF MOTION OF RIGHT ANKLE: Primary | ICD-10-CM

## 2023-03-10 DIAGNOSIS — R26.9 GAIT ABNORMALITY: ICD-10-CM

## 2023-03-10 PROCEDURE — 97112 NEUROMUSCULAR REEDUCATION: CPT

## 2023-03-10 PROCEDURE — 97530 THERAPEUTIC ACTIVITIES: CPT

## 2023-03-10 NOTE — PROGRESS NOTES
Physical Therapy Daily Treatment Note     [facility] Number: [id number]    Therapy Diagnosis:   Encounter Diagnoses   Name Primary?    Decreased range of motion of right ankle Yes    Decreased strength of lower extremity     Gait abnormality          Physician: Aayush Amos MD    Visit Date: 3/10/2023     Physician Orders: PT Eval and Treat   Medical Diagnosis from Referral: Subluxation of peroneal tendon of right foot [S93.331A], Ankle impingement syndrome, right [M25.871]  Evaluation Date: 12/9/2022  Authorization Period Expiration: 12/6/23  Plan of Care Expiration: 5/29/23  Visit # / Visits authorized: 2/ 20 (18 total)    Time In: 1:01 PM  Time Out: 2:00 PM  Total Billable Time: 59 minutes    Precautions: Standard  PROCEDURE:  DOS: 12/6/22 13 weeks 3 day s/p  Right ankle arthroscopy and debridement with removal of loose body  ORIF of syndesmosis  SPR reconstruction  Peroneus longus repair  Debridement of peroneus brevis tenosynovitis  Manual stress examination of ankle  POST-OP PLAN:  WB Status:  NWB X 6 weeks. 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   Physical therapy: to begin Friday  DVT prophylaxis: full dose ASA while NWB  Antibiotics: n/a  Pain control: given percocet and anti-inflammatories  Dressing: placed in boot. Do not get sutures wet until they have been removed  If questions call [phone] and ask for Dr. Ruma Rodriguez.  Disposition: Follow-up with me as scheduled.  XR at f/u NWB 3 views ankle     NWB for 6 weeks, no eversion for 6 weeks, passive and active range of motion  Dr. Rodriguez protocol  Subjective     Pt reports: was sore following Wednesday but otherwise no complaints. Saw Dr. Rodriguez yesterday and she was pleased with my progress and said I'm probably ahead of schedule   He was compliant with home exercise program  Response to previous treatment: improved DF ROM  Functional change: improved gait mechanics    Pain:  "0/10  Location: right ankles     Objective   Objective testing: 3/1/23    Observation:  Gait: no major gait abnormalities     Ankle Passive Range of Motion:   Ankle Right Left   DF (knee extended) 8 8   DF (knee bent) 8 8   Plantarflexion 60 60   Inversion 35 35   Eversion 25 25     CKC DF ROM  Right: 36 deg  Left: 38 deg  2 deg deficit    SL calf raise reps:  Right: 24 reps  Left: 30 reps    Y Balance:   Right  Left  " difference   Anterior reach 59.5" 63.4" 4" deficit   Posteromedial 113.5" 115" 1.5" deficit   Posterolateral 105.5" 106.5 1" deficit       Taras received therapeutic exercises to develop strength, endurance, ROM, and flexibility for 0 minutes including:  Not today  Self DF mobilization with purple power band 25x 5s holds  Single leg calf raise isometrics 45s holds x5         Taras received the following manual therapy techniques: Joint mobilizations and Soft tissue Mobilization were applied to the: R ankle for 0 minutes, including:  Grade III/IV a/p talocrural joint mobs  Grade III/IV subtalar joint mobilizations  Grade V HVLA talocrural distraction manipulation    Not today:  Scar mobilization  IASTM: gastroc/soleus, scar       Taras participated in neuromuscular re-education activities to improve: Balance, Coordination, Kinesthetic, Sense, and Proprioception for 29 minutes. The following activities were included:  18" single leg box jump 4x6 B  12" single leg depth drop with lateral rebound 12" stephanie jump 4x6B        12" single leg depth drop 2x6 with reactive ball catch   Frontal plane 20# 3D strap lateral dynamic landings 2x6  Frontal plane 20# 3D strap lateral dynamic landings + counter jump 2x6      Not today  Standing clamshell 3x12 GTB at knees   Resisted lateral steps: 2 laps flat foot, 2 laps in PF  Tunisian split squat 4x8-10 25# KB front rack  Y balance: 2x6B  Single leg RDL 3x8 15# KB  SLS on airex with reactive football catch 30x 3  SLS with hassan rope slam " 30sx4      Taras participated in dynamic functional therapeutic activities to improve functional performance for 30 minutes, including:  Elliptical 8' to improve CV endurance and tissue extensibility  Dynamic warmup: frankensteins, high kneels, butt kicks, power skips, A-skip, B-Skip  70# landmine SL RDL 4x6-8  Lateral resisted sport cord shuffle-> plant sprint 4x6  45# sled sprint intervals x 5 rounds          Not today:  Tajik SS plyo jumps 4x6   Agility ladder training: high knees, laterals, in/outs, diagonals  Fan bike interval circuits: 20s normal 10s sprint x 5 rounds  Barbell back-squat 135# x12, 185# x10, 225# 2x8    Home Exercises Provided and Patient Education Provided     Education provided:   - updated HEP, POC, precautions, recovery timeline    Written Home Exercises Provided: Patient instructed to cont prior HEP.  Exercises were reviewed and Taras was able to demonstrate them prior to the end of the session.  Taras demonstrated good  understanding of the education provided.     See EMR under Patient Instructions for exercises provided prior visit.    Assessment   Patient completed session as noted above with progressions made towards plyometric and deceleration based functional movement patterns. Improvement noted in rate of force development during session today. Intro'd frontal plane stability plyometrics with no complaints of pain. Decreased rate of force development noted on RLE. Overall tolerated treatment session well with no complaints of pain but sig fatigue and difficulty 2/2 lack of power production. 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        Taras Is progressing well towards his goals.   Pt prognosis is Excellent.     Pt will continue to benefit from skilled outpatient physical therapy to address the deficits  listed in the problem list box on initial evaluation, provide pt/family education and to maximize pt's level of independence in the home and community environment.     Pt's spiritual, cultural and educational needs considered and pt agreeable to plan of care and goals.     Anticipated barriers to physical therapy: none    Goals: Goals:  Short Term Goals (4 Weeks):  1.  Pts pain level decreased to 75% or greater for with  for restoring functional mobility and ADL. MET  2. Pts AROM in R DF increased by 10 degrees to restore functional mobility and ADL MET  3. Pts strength in the RLE increased by one grade to facilitate improved active mobility and functional stability MET  4. Pt will be independent with HEP for self-management.  MET  5. Pt to exhibit dynamic stability on the RLE for stable functional mobility and gait.  In progress  6. Pt to demonstrate symmetrical weight bearing w/o pain to improve gait pattern with assistive device  MET     Long term goals (24 weeks) All LTG in progress  1.pt will pass return to running criteria and hopping progression for return to running  2. pt will demonstrate at least 95% LSI with figure 8, lateral, and square hop testing for decreased reinjury risk  3. pt will demonstrate appropriate SL calf raise endurance compared to age norms with incline calf raise test for improved ankle function  4. Patient will demonstrate appropriate mechanics with sport specific activities for full return to sport  5. Patient will improve FOTO score to </= TBD% limited to decrease perceived limitation with mobility.    Plan   Continue to focus on ROM, NM control deficits, overall RLE strength and conditioning required for higher level functional activities    BRITTNEE OLIVEIRA, PT

## 2023-03-20 ENCOUNTER — CLINICAL SUPPORT (OUTPATIENT)
Dept: REHABILITATION | Facility: HOSPITAL | Age: 33
End: 2023-03-20
Payer: COMMERCIAL

## 2023-03-20 DIAGNOSIS — R29.898 DECREASED STRENGTH OF LOWER EXTREMITY: ICD-10-CM

## 2023-03-20 DIAGNOSIS — R26.9 GAIT ABNORMALITY: ICD-10-CM

## 2023-03-20 DIAGNOSIS — M25.671 DECREASED RANGE OF MOTION OF RIGHT ANKLE: Primary | ICD-10-CM

## 2023-03-20 PROCEDURE — 97530 THERAPEUTIC ACTIVITIES: CPT

## 2023-03-24 ENCOUNTER — CLINICAL SUPPORT (OUTPATIENT)
Dept: REHABILITATION | Facility: HOSPITAL | Age: 33
End: 2023-03-24
Payer: COMMERCIAL

## 2023-03-24 DIAGNOSIS — R29.898 DECREASED STRENGTH OF LOWER EXTREMITY: ICD-10-CM

## 2023-03-24 DIAGNOSIS — R26.9 GAIT ABNORMALITY: ICD-10-CM

## 2023-03-24 DIAGNOSIS — M25.671 DECREASED RANGE OF MOTION OF RIGHT ANKLE: Primary | ICD-10-CM

## 2023-03-24 PROCEDURE — 97530 THERAPEUTIC ACTIVITIES: CPT

## 2023-03-24 NOTE — PROGRESS NOTES
Physical Therapy Daily Treatment Note     [facility] Number: [id number]    Therapy Diagnosis:   Encounter Diagnoses   Name Primary?    Decreased range of motion of right ankle Yes    Decreased strength of lower extremity     Gait abnormality      Physician: Aayush Amos MD    Visit Date: 3/24/2023     Physician Orders: PT Eval and Treat   Medical Diagnosis from Referral: Subluxation of peroneal tendon of right foot [S93.331A], Ankle impingement syndrome, right [M25.871]  Evaluation Date: 12/9/2022  Authorization Period Expiration: 12/6/23  Plan of Care Expiration: 5/29/23  Visit # / Visits authorized: 4/ 20 (18 total)    Time In: 1:05 PM  Time Out: 2:00 PM  Total Billable Time: 55 minutes    Precautions: Standard  PROCEDURE:  DOS: 12/6/22 13 weeks 3 day s/p  Right ankle arthroscopy and debridement with removal of loose body  ORIF of syndesmosis  SPR reconstruction  Peroneus longus repair  Debridement of peroneus brevis tenosynovitis  Manual stress examination of ankle  POST-OP PLAN:  WB Status:  NWB X 6 weeks. 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   Physical therapy: to begin Friday  DVT prophylaxis: full dose ASA while NWB  Antibiotics: n/a  Pain control: given percocet and anti-inflammatories  Dressing: placed in boot. Do not get sutures wet until they have been removed  If questions call [phone] and ask for Dr. Ruma Rodriguez.  Disposition: Follow-up with me as scheduled.  XR at f/u NWB 3 views ankle     NWB for 6 weeks, no eversion for 6 weeks, passive and active range of motion  Dr. Rodriguez protocol  Subjective     Pt reports: got back in with [facility] at [employer] yesterday so sore    He was compliant with home exercise program  Response to previous treatment: improved DF ROM  Functional change: improved gait mechanics    Pain: 0/10  Location: right ankles     Objective   Objective testing: 3/1/23    Observation:  Gait: no major gait  "abnormalities     Ankle Passive Range of Motion:   Ankle Right Left   DF (knee extended) 8 8   DF (knee bent) 8 8   Plantarflexion 60 60   Inversion 35 35   Eversion 25 25     CKC DF ROM  Right: 36 deg  Left: 38 deg  2 deg deficit    SL calf raise reps:  Right: 24 reps  Left: 30 reps    Y Balance:   Right  Left  " difference   Anterior reach 59.5" 63.4" 4" deficit   Posteromedial 113.5" 115" 1.5" deficit   Posterolateral 105.5" 106.5 1" deficit       Taras received therapeutic exercises to develop strength, endurance, ROM, and flexibility for 0 minutes including:  Not today  Self DF mobilization with purple power band 25x 5s holds  Single leg calf raise isometrics 45s holds x5         Taras received the following manual therapy techniques: Joint mobilizations and Soft tissue Mobilization were applied to the: R ankle for 0 minutes, including:    Not today:  Grade III/IV a/p talocrural joint mobs  Grade III/IV subtalar joint mobilizations  Grade V HVLA talocrural distraction manipulation  Scar mobilization  IASTM: gastroc/soleus, scar       Taras participated in neuromuscular re-education activities to improve: Balance, Coordination, Kinesthetic, Sense, and Proprioception for 0 minutes. The following activities were included:        Not today  12" single leg depth drop 2x6 with reactive ball catch   Frontal plane 20# 3D strap lateral dynamic landings 2x6  Frontal plane 20# 3D strap lateral dynamic landings + counter jump 2x6  18" single leg box jump 4x6 B  12" single leg depth drop with lateral rebound 12" stephanie jump 4x6B  Standing clamshell 3x12 GTB at knees   Resisted lateral steps: 2 laps flat foot, 2 laps in PF  Hungarian split squat 4x8-10 25# KB front rack  Y balance: 2x6B  Single leg RDL 3x8 15# KB  SLS on airex with reactive football catch 30x 3  SLS with hassan rope slam 30sx4      Taras participated in dynamic functional therapeutic activities to improve functional performance for 55 " minutes, including:  Dynamic warmup: frankensteins, high kneels, butt kicks, power skips, A-skip, B-Skip  Fig 4 square hop: 4x8  Lateral hop 3x6  4 cone shuffle drill x 4 laps  Cone slant drill x 4 laps  Slant routes x 6 each side  In routes x 6 each side          Not today:    Reactive tennis ball track & catch x 4 rounds  Purple sports cords assisted power jump 4x8      70# landmine SL RDL 4x6-8  Lateral resisted sport cord shuffle-> plant sprint 4x6  45# sled sprint intervals x 5 rounds  Elliptical 8' to improve CV endurance and tissue extensibility  [facility] SS plyo jumps 4x6   Agility ladder training: high knees, laterals, in/outs, diagonals  Fan bike interval circuits: 20s normal 10s sprint x 5 rounds  Barbell back-squat 135# x12, 185# x10, 225# 2x8    Home Exercises Provided and Patient Education Provided     Education provided:   - updated HEP, POC, precautions, recovery timeline    Written Home Exercises Provided: Patient instructed to cont prior HEP.  Exercises were reviewed and Taras was able to demonstrate them prior to the end of the session.  Taras demonstrated good  understanding of the education provided.     See EMR under Patient Instructions for exercises provided prior visit.    Assessment   Patient completed session as noted above with progressions made towards plyometric and sport specific functional movement patterns with increased change of direction and level of random practice. Able to complete all change of direction activities with no complaints of pain. Overall progressing very well just largely limited 2/2 overall deconditioned. 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        Taras Is progressing well towards his goals.   Pt prognosis is Excellent.     Pt  will continue to benefit from skilled outpatient physical therapy to address the deficits listed in the problem list box on initial evaluation, provide pt/family education and to maximize pt's level of independence in the home and community environment.     Pt's spiritual, cultural and educational needs considered and pt agreeable to plan of care and goals.     Anticipated barriers to physical therapy: none    Goals: Goals:  Short Term Goals (4 Weeks):  1.  Pts pain level decreased to 75% or greater for with  for restoring functional mobility and ADL. MET  2. Pts AROM in R DF increased by 10 degrees to restore functional mobility and ADL MET  3. Pts strength in the RLE increased by one grade to facilitate improved active mobility and functional stability MET  4. Pt will be independent with HEP for self-management.  MET  5. Pt to exhibit dynamic stability on the RLE for stable functional mobility and gait.  In progress  6. Pt to demonstrate symmetrical weight bearing w/o pain to improve gait pattern with assistive device  MET     Long term goals (24 weeks) All LTG in progress  1.pt will pass return to running criteria and hopping progression for return to running  2. pt will demonstrate at least 95% LSI with figure 8, lateral, and square hop testing for decreased reinjury risk  3. pt will demonstrate appropriate SL calf raise endurance compared to age norms with incline calf raise test for improved ankle function  4. Patient will demonstrate appropriate mechanics with sport specific activities for full return to sport  5. Patient will improve FOTO score to </= TBD% limited to decrease perceived limitation with mobility.    Plan   Continue to focus on ROM, NM control deficits, overall RLE strength and conditioning required for higher level functional activities    BRITTNEE OLIVEIRA, PT

## 2023-03-29 ENCOUNTER — CLINICAL SUPPORT (OUTPATIENT)
Dept: REHABILITATION | Facility: HOSPITAL | Age: 33
End: 2023-03-29
Payer: COMMERCIAL

## 2023-03-29 DIAGNOSIS — M25.671 DECREASED RANGE OF MOTION OF RIGHT ANKLE: Primary | ICD-10-CM

## 2023-03-29 DIAGNOSIS — R29.898 DECREASED STRENGTH OF LOWER EXTREMITY: ICD-10-CM

## 2023-03-29 DIAGNOSIS — R26.9 GAIT ABNORMALITY: ICD-10-CM

## 2023-03-29 PROCEDURE — 97112 NEUROMUSCULAR REEDUCATION: CPT

## 2023-03-29 PROCEDURE — 97530 THERAPEUTIC ACTIVITIES: CPT

## 2023-03-29 NOTE — PROGRESS NOTES
Physical Therapy Daily Treatment Note     [facility] Number: [id number]    Therapy Diagnosis:   Encounter Diagnoses   Name Primary?    Decreased range of motion of right ankle Yes    Decreased strength of lower extremity     Gait abnormality        Physician: Aayush Amos MD    Visit Date: 3/29/2023     Physician Orders: PT Eval and Treat   Medical Diagnosis from Referral: Subluxation of peroneal tendon of right foot [S93.331A], Ankle impingement syndrome, right [M25.871]  Evaluation Date: 12/9/2022  Authorization Period Expiration: 12/6/23  Plan of Care Expiration: 5/29/23  Visit # / Visits authorized: 5/ 20 (21 total)    Time In: 2:04 PM  Time Out: 3:00 PM  Total Billable Time: 56 minutes    Precautions: Standard  PROCEDURE:  DOS: 12/6/22 13 weeks 3 day s/p  Right ankle arthroscopy and debridement with removal of loose body  ORIF of syndesmosis  SPR reconstruction  Peroneus longus repair  Debridement of peroneus brevis tenosynovitis  Manual stress examination of ankle  POST-OP PLAN:  WB Status:  NWB X 6 weeks. 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   Physical therapy: to begin Friday  DVT prophylaxis: full dose ASA while NWB  Antibiotics: n/a  Pain control: given percocet and anti-inflammatories  Dressing: placed in boot. Do not get sutures wet until they have been removed  If questions call [phone] and ask for Dr. Ruma Rodriguez.  Disposition: Follow-up with me as scheduled.  XR at f/u NWB 3 views ankle     NWB for 6 weeks, no eversion for 6 weeks, passive and active range of motion  Dr. Rodriguez protocol  Subjective     Pt reports: got back in with [facility] yesterday so sore    He was compliant with home exercise program  Response to previous treatment: improved DF ROM  Functional change: improved gait mechanics    Pain: 0/10  Location: right ankles     Objective   Objective testing: 3/1/23    Observation:  Gait: no major gait  "abnormalities     Ankle Passive Range of Motion:   Ankle Right Left   DF (knee extended) 8 8   DF (knee bent) 8 8   Plantarflexion 60 60   Inversion 35 35   Eversion 25 25     CKC DF ROM  Right: 36 deg  Left: 38 deg  2 deg deficit    SL calf raise reps:  Right: 24 reps  Left: 30 reps    Y Balance:   Right  Left  " difference   Anterior reach 59.5" 63.4" 4" deficit   Posteromedial 113.5" 115" 1.5" deficit   Posterolateral 105.5" 106.5 1" deficit       Taras received therapeutic exercises to develop strength, endurance, ROM, and flexibility for 0 minutes including:  Not today  Self DF mobilization with purple power band 25x 5s holds  Single leg calf raise isometrics 45s holds x5         Taras received the following manual therapy techniques: Joint mobilizations and Soft tissue Mobilization were applied to the: R ankle for 0 minutes, including:    Not today:  Grade III/IV a/p talocrural joint mobs  Grade III/IV subtalar joint mobilizations  Grade V HVLA talocrural distraction manipulation  Scar mobilization  IASTM: gastroc/soleus, scar       Taras participated in neuromuscular re-education activities to improve: Balance, Coordination, Kinesthetic, Sense, and Proprioception for 10 minutes. The following activities were included:  SLS on mini bosu with 15# KB pass 5x30s      Not today  12" single leg depth drop 2x6 with reactive ball catch   Frontal plane 20# 3D strap lateral dynamic landings 2x6  Frontal plane 20# 3D strap lateral dynamic landings + counter jump 2x6  18" single leg box jump 4x6 B  12" single leg depth drop with lateral rebound 12" stephanie jump 4x6B  Standing clamshell 3x12 GTB at knees   Resisted lateral steps: 2 laps flat foot, 2 laps in PF  German split squat 4x8-10 25# KB front rack  Y balance: 2x6B  Single leg RDL 3x8 15# KB  SLS on airex with reactive football catch 30x 3  SLS with hassan rope slam 30sx4      Taras participated in dynamic functional therapeutic activities to " improve functional performance for 46 minutes, including:  Dynamic warmup: frankensteins, high kneels, butt kicks, power skips, A-skip, B-Skip  Fig 4 square hop: 4x8  SL broad jump 3x6  Triple jump 3x6  Vasso strapresisted  lateral jump 4x5B        Not today:  Lateral hop 3x6  4 cone shuffle drill x 4 laps  Cone slant drill x 4 laps  Slant routes x 6 each side  In routes x 6 each side  Reactive tennis ball track & catch x 4 rounds  Purple sports cords assisted power jump 4x8    70# landmine SL RDL 4x6-8  Lateral resisted sport cord shuffle-> plant sprint 4x6  45# sled sprint intervals x 5 rounds  Elliptical 8' to improve CV endurance and tissue extensibility  [place] SS plyo jumps 4x6   Agility ladder training: high knees, laterals, in/outs, diagonals  Fan bike interval circuits: 20s normal 10s sprint x 5 rounds  Barbell back-squat 135# x12, 185# x10, 225# 2x8    Home Exercises Provided and Patient Education Provided     Education provided:   - updated HEP, POC, precautions, recovery timeline    Written Home Exercises Provided: Patient instructed to cont prior HEP.  Exercises were reviewed and Taras was able to demonstrate them prior to the end of the session.  Taras demonstrated good  understanding of the education provided.     See EMR under Patient Instructions for exercises provided prior visit.    Assessment   Patient completed session as noted above with continued focus on rate of force developments through plyometrics and functional strengthening. Kristin continues to demonstrate progress from session to session in coordination of higher level functional movements. 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        Taras Is progressing well towards his goals.   Pt prognosis is  Excellent.     Pt will continue to benefit from skilled outpatient physical therapy to address the deficits listed in the problem list box on initial evaluation, provide pt/family education and to maximize pt's level of independence in the home and community environment.     Pt's spiritual, cultural and educational needs considered and pt agreeable to plan of care and goals.     Anticipated barriers to physical therapy: none    Goals: Goals:  Short Term Goals (4 Weeks):  1.  Pts pain level decreased to 75% or greater for with  for restoring functional mobility and ADL. MET  2. Pts AROM in R DF increased by 10 degrees to restore functional mobility and ADL MET  3. Pts strength in the RLE increased by one grade to facilitate improved active mobility and functional stability MET  4. Pt will be independent with HEP for self-management.  MET  5. Pt to exhibit dynamic stability on the RLE for stable functional mobility and gait.  In progress  6. Pt to demonstrate symmetrical weight bearing w/o pain to improve gait pattern with assistive device  MET     Long term goals (24 weeks) All LTG in progress  1.pt will pass return to running criteria and hopping progression for return to running  2. pt will demonstrate at least 95% LSI with figure 8, lateral, and square hop testing for decreased reinjury risk  3. pt will demonstrate appropriate SL calf raise endurance compared to age norms with incline calf raise test for improved ankle function  4. Patient will demonstrate appropriate mechanics with sport specific activities for full return to sport  5. Patient will improve FOTO score to </= TBD% limited to decrease perceived limitation with mobility.    Plan   Continue to focus on ROM, NM control deficits, overall RLE strength and conditioning required for higher level functional activities    BRITTNEE OLIVEIRA, PT

## 2023-04-19 ENCOUNTER — CLINICAL SUPPORT (OUTPATIENT)
Dept: REHABILITATION | Facility: HOSPITAL | Age: 33
End: 2023-04-19
Payer: COMMERCIAL

## 2023-04-19 DIAGNOSIS — R29.898 DECREASED STRENGTH OF LOWER EXTREMITY: ICD-10-CM

## 2023-04-19 DIAGNOSIS — M25.671 DECREASED RANGE OF MOTION OF RIGHT ANKLE: Primary | ICD-10-CM

## 2023-04-19 DIAGNOSIS — R26.9 GAIT ABNORMALITY: ICD-10-CM

## 2023-04-19 PROCEDURE — 97530 THERAPEUTIC ACTIVITIES: CPT

## 2023-04-19 NOTE — PROGRESS NOTES
Physical Therapy Daily Treatment Note     [facility] Number[facility] [id number]    Therapy Diagnosis:   Encounter Diagnoses   Name Primary?    Decreased range of motion of right ankle Yes    Decreased strength of lower extremity     Gait abnormality          Physician: Aayush Amos MD    Visit Date: 4/19/2023     Physician Orders: PT Eval and Treat   Medical Diagnosis from Referral: Subluxation of peroneal tendon of right foot [S93.331A], Ankle impingement syndrome, right [M25.871]  Evaluation Date: 12/9/2022  Authorization Period Expiration: 12/6/23  Plan of Care Expiration: 5/29/23  Visit # / Visits authorized: 5/ 20 (21 total)    Time In: 1:03 PM  Time Out: 2:00 PM  Total Billable Time: 57 minutes    Precautions: Standard  PROCEDURE:  DOS: 12/6/22 13 weeks 3 day s/p  Right ankle arthroscopy and debridement with removal of loose body  ORIF of syndesmosis  SPR reconstruction  Peroneus longus repair  Debridement of peroneus brevis tenosynovitis  Manual stress examination of ankle  POST-OP PLAN:  WB Status:  NWB X 6 weeks. 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   Physical therapy: to begin Friday  DVT prophylaxis: full dose ASA while NWB  Antibiotics: n/a  Pain control: given percocet and anti-inflammatories  Dressing: placed in boot. Do not get sutures wet until they have been removed  If questions call [phone] and ask for Dr. Ruma Rodriguez.  Disposition: Follow-up with me as scheduled.  XR at f/u NWB 3 views ankle     NWB for 6 weeks, no eversion for 6 weeks, passive and active range of motion  Dr. Rodriguez protocol  Subjective     Pt reports: missed last 2-3 weeks but been working in the gym. Ready to test today   He was compliant with home exercise program  Response to previous treatment: no pain  Functional change: normal ROM  Pain: 0/10  Location: right ankles     Objective     Objective Testing  4/19/23    30cm lateral hopping: (average of 2  "trials)   L: 8.4s  R: 7.9s  Deficit: 6% better    Square hopping:    L: 13.6s  R: 12.3s  Deficit: 10% better    Hop Testing   Right  Left  % deficit   SL broad jump: 77, 78, 79"  Average: 78 69, 72, 74"  Average: 71.7 8% better   Triple jump: 273, 267, 272  Average: 271 277, 279, 280  Average: 279 3% deficit         Taras received therapeutic exercises to develop strength, endurance, ROM, and flexibility for 0 minutes including:  Not today  Self DF mobilization with purple power band 25x 5s holds  Single leg calf raise isometrics 45s holds x5         Taras received the following manual therapy techniques: Joint mobilizations and Soft tissue Mobilization were applied to the: R ankle for 0 minutes, including:    Not today:  Grade III/IV a/p talocrural joint mobs  Grade III/IV subtalar joint mobilizations  Grade V HVLA talocrural distraction manipulation  Scar mobilization  IASTM: gastroc/soleus, scar       Taras participated in neuromuscular re-education activities to improve: Balance, Coordination, Kinesthetic, Sense, and Proprioception for 00 minutes. The following activities were included:    Not today  SLS on mini bosu with 15# KB pass 5x30s  12" single leg depth drop 2x6 with reactive ball catch   Frontal plane 20# 3D strap lateral dynamic landings 2x6  Frontal plane 20# 3D strap lateral dynamic landings + counter jump 2x6  18" single leg box jump 4x6 B  12" single leg depth drop with lateral rebound 12" stephanie jump 4x6B  Standing clamshell 3x12 GTB at knees   Resisted lateral steps: 2 laps flat foot, 2 laps in PF  [facility] split squat 4x8-10 25# KB front rack  Y balance: 2x6B  Single leg RDL 3x8 15# KB  SLS on airex with reactive football catch 30x 3  SLS with hassan rope slam 30sx4      Taras participated in dynamic functional therapeutic activities to improve functional performance for 57 minutes, including:  Dynamic warmup: frankensteins, high kneels, butt kicks, power skips, A-skip, " B-Skip  Objective testin'        Not today:  Fig 4 square hop: 4x8  SL broad jump 3x6  Triple jump 3x6  Vasso strapresisted  lateral jump 4x5B  Lateral hop 3x6  4 cone shuffle drill x 4 laps  Cone slant drill x 4 laps  Slant routes x 6 each side  In routes x 6 each side  Reactive tennis ball track & catch x 4 rounds  Purple sports cords assisted power jump 4x8    70# landmine SL RDL 4x6-8  Lateral resisted sport cord shuffle-> plant sprint 4x6  45# sled sprint intervals x 5 rounds  Elliptical 8' to improve CV endurance and tissue extensibility  [place] SS plyo jumps 4x6   Agility ladder training: high knees, laterals, in/outs, diagonals  Fan bike interval circuits: 20s normal 10s sprint x 5 rounds  Barbell back-squat 135# x12, 185# x10, 225# 2x8    Home Exercises Provided and Patient Education Provided     Education provided:   - updated HEP, POC, precautions, recovery timeline    Written Home Exercises Provided: Patient instructed to cont prior HEP.  Exercises were reviewed and Taras was able to demonstrate them prior to the end of the session.  Taras demonstrated good  understanding of the education provided.     See EMR under Patient Instructions for exercises provided prior visit.    Assessment   Patient completed session as noted above with additional objective testing performed to assess readiness for RTS. Patient demonstrated excellent performance during hop testing with all within passing score of <10% deficit. At this time patient is no longer functionally limited and is appropriate for discharge. Patient will f/u as needed in clinic      Short Term Goals (4 Weeks):  1.  Pts pain level decreased to 75% or greater for with  for restoring functional mobility and ADL. MET  2. Pts AROM in R DF increased by 10 degrees to restore functional mobility and ADL MET  3. Pts strength in the RLE increased by one grade to facilitate improved active mobility and functional stability MET  4. Pt will be  independent with HEP for self-management.  MET  5. Pt to exhibit dynamic stability on the RLE for stable functional mobility and gait.  In progress  6. Pt to demonstrate symmetrical weight bearing w/o pain to improve gait pattern with assistive device  MET     Long term goals (24 weeks) All LTG in progress  1.pt will pass return to running criteria and hopping progression for return to running MET  2. pt will demonstrate at least 95% LSI with figure 8, lateral, and square hop testing for decreased reinjury risk MET  3. pt will demonstrate appropriate SL calf raise endurance compared to age norms with incline calf raise test for improved ankle function MET  4. Patient will demonstrate appropriate mechanics with sport specific activities for full return to sport MET  5. Patient will improve FOTO score to </= TBD% limited to decrease perceived limitation with mobility. MET    Plan   D/c patient at this time   Educated on continued strength and conditioning      BRITTNEE OLIVEIRA, PT, DPT

## 2024-12-03 ENCOUNTER — HOSPITAL ENCOUNTER (EMERGENCY)
Facility: HOSPITAL | Age: 34
Discharge: HOME | End: 2024-12-03
Attending: EMERGENCY MEDICINE

## 2024-12-03 VITALS
HEART RATE: 70 BPM | BODY MASS INDEX: 29.22 KG/M2 | OXYGEN SATURATION: 99 % | HEIGHT: 75 IN | RESPIRATION RATE: 19 BRPM | DIASTOLIC BLOOD PRESSURE: 90 MMHG | SYSTOLIC BLOOD PRESSURE: 150 MMHG | TEMPERATURE: 98 F | WEIGHT: 235 LBS

## 2024-12-03 DIAGNOSIS — G44.209 TENSION HEADACHE: Primary | ICD-10-CM

## 2024-12-03 PROCEDURE — 99284 EMERGENCY DEPT VISIT MOD MDM: CPT | Mod: 25

## 2024-12-03 PROCEDURE — 63600175 PHARM REV CODE 636 W HCPCS: Performed by: NURSE PRACTITIONER

## 2024-12-03 PROCEDURE — 96372 THER/PROPH/DIAG INJ SC/IM: CPT | Performed by: NURSE PRACTITIONER

## 2024-12-03 PROCEDURE — 25000003 PHARM REV CODE 250: Performed by: NURSE PRACTITIONER

## 2024-12-03 RX ORDER — KETOROLAC TROMETHAMINE 30 MG/ML
15 INJECTION, SOLUTION INTRAMUSCULAR; INTRAVENOUS
Status: COMPLETED | OUTPATIENT
Start: 2024-12-03 | End: 2024-12-03

## 2024-12-03 RX ORDER — BUTALBITAL, ACETAMINOPHEN AND CAFFEINE 50; 325; 40 MG/1; MG/1; MG/1
1-2 TABLET ORAL EVERY 8 HOURS PRN
Qty: 12 TABLET | Refills: 0 | Status: SHIPPED | OUTPATIENT
Start: 2024-12-03

## 2024-12-03 RX ORDER — BUTALBITAL, ACETAMINOPHEN AND CAFFEINE 50; 325; 40 MG/1; MG/1; MG/1
1 TABLET ORAL
Status: COMPLETED | OUTPATIENT
Start: 2024-12-03 | End: 2024-12-03

## 2024-12-03 RX ADMIN — KETOROLAC TROMETHAMINE 15 MG: 30 INJECTION, SOLUTION INTRAMUSCULAR; INTRAVENOUS at 03:12

## 2024-12-03 RX ADMIN — BUTALBITAL, ACETAMINOPHEN, AND CAFFEINE 1 TABLET: 325; 50; 40 TABLET ORAL at 02:12

## 2024-12-03 NOTE — ED PROVIDER NOTES
Encounter Date: 12/3/2024       History     Chief Complaint   Patient presents with    Headache     33 yo male to triage for HA. Pt states he has been having constant HA's for about a week now. Denies Blurred vision, slurred speech, N/V/D, CP, SOB. AAOx4, NAD      34 year old male with no pertinent past medical history presents to the Ed due to a constant 5/10 headache x1 week. Headache seems to be more intense when laying down with associated symptoms of lightheadedness. Patient states it feels like a tight band around his head. Tried Tylenol and Ibuprofen with mild improvement of symptoms. Denies new medications, blurred vision, dizziness, syncope, nausea, vomiting, fever, recent illness, congestion, chest pain, or SOB.        Review of patient's allergies indicates:   Allergen Reactions    Amoxicillin (bulk)     Penicillins      History reviewed. No pertinent past medical history.  Past Surgical History:   Procedure Laterality Date    ARTHROSCOPIC REMOVAL OF LOOSE BODY FROM JOINT Right 12/6/2022    Procedure: REMOVAL, LOOSE BODY, JOINT, ARTHROSCOPIC;  Surgeon: Ruma Rodriguez MD;  Location: [facility] OR;  Service: Orthopedics;  Laterality: Right;    ARTHROSCOPY OF ANKLE WITH DEBRIDEMENT Right 12/6/2022    Procedure: ARTHROSCOPY, ANKLE, WITH DEBRIDEMENT;  Surgeon: Ruma Rodriguez MD;  Location: [facility] OR;  Service: Orthopedics;  Laterality: Right;    HAND SURGERY      HERNIA REPAIR      OPEN REDUCTION AND INTERNAL FIXATION (ORIF) OF INJURY OF ANKLE Right 12/6/2022    Procedure: ORIF, ANKLE SYNDESMOSIS;  Surgeon: Ruma Rodriguez MD;  Location: [facility] OR;  Service: Orthopedics;  Laterality: Right;    REPAIR OF ACHILLES TENDON Right 12/6/2022    Procedure: REPAIR, TENDON, PERONEAL, DELTOID;  Surgeon: Ruma Rodriguez MD;  Location: [facility] OR;  Service: Orthopedics;  Laterality: Right;    REPAIR OF LIGAMENT OF ANKLE Right 12/6/2022    Procedure: REPAIR, LIGAMENT, ANKLE LATERAL RECONSTRUCTION;  Surgeon: Ruma Rodriguez MD;  [facility];  Service: Orthopedics;  Laterality: Right;  Trisha 1/3 tubular plates, Synotifix, Smith and Nephew Raptormites, Arthex internal brace, Reps ,Noemy Yuan, Sameer Kenney,Job Seth, Hardy Hood     Family History   Problem Relation Name Age of Onset    Cancer Father       Social History     Tobacco Use    Smoking status: Never    Smokeless tobacco: Never   Substance Use Topics    Alcohol use: Yes     Comment: social    Drug use: Not Currently     Types: Marijuana     Review of Systems   Constitutional:  Negative for fever.   HENT:  Negative for ear pain and sinus pressure.    Respiratory:  Negative for shortness of breath.    Cardiovascular:  Negative for chest pain.   Gastrointestinal:  Negative for abdominal pain, nausea and vomiting.   Musculoskeletal:  Negative for gait problem.   Skin:  Negative for color change.   Neurological:  Positive for light-headedness and headaches. Negative for dizziness, syncope and weakness.       Physical Exam     Initial Vitals [12/03/24 1409]   BP Pulse Resp Temp SpO2   (!) 160/92 73 17 98.2 °F (36.8 °C) 100 %      MAP       --         Physical Exam    Constitutional: He appears well-developed and well-nourished.   HENT:   Head: Normocephalic and atraumatic.   Eyes: EOM are normal. Pupils are equal, round, and reactive to light.   Neck: Neck supple.   Normal range of motion.  Cardiovascular:  Normal rate, regular rhythm and normal heart sounds.           Pulmonary/Chest: Breath sounds normal. No respiratory distress. He has no wheezes. He has no rhonchi. He has no rales. He exhibits no tenderness.   Musculoskeletal:         General: Normal range of motion.      Cervical back: Normal range of motion and neck supple.     Neurological: He is alert and oriented to person, place, and time. He has normal strength and normal reflexes. He displays normal reflexes. No cranial nerve deficit or sensory deficit.         ED Course   Procedures  Labs Reviewed - No data to  "display       Imaging Results    None          Medications   butalbital-acetaminophen-caffeine -40 mg per tablet 1 tablet (1 tablet Oral Given 12/3/24 1432)   ketorolac injection 15 mg (15 mg Intramuscular Given 12/3/24 1529)     Medical Decision Making  34 year old male with no pertinent past medical history presents to the Ed due to a constant 5/10 headache x1 week. Headache seems to be more intense when laying down with associated symptoms of lightheadedness. Patient states it feels like a tight band around his head. Tried Tylenol and Ibuprofen with mild improvement of symptoms. Denies new medications, blurred vision, dizziness, syncope, nausea, vomiting, fever, recent illness, congestion, chest pain, or SOB.    Differential diagnosis considered: SAH, Hypertensive emergency, TMJ, Giant Cell Arteries, Cortaid artery dissection, Migraine Headache, Cluster headache, or Tension headache.    No recent trauma, cranial nerves intact, No blurred vision, dizziness, syncope, slurred speech. No past medical history of hypertension. Patient given Fiorcet in the ED. Will continue to assess for improvement.     Problems Addressed:  Tension headache: acute illness or injury     Details: Fioricet given in the ER discharged on same.  Follow up as directed.    Amount and/or Complexity of Data Reviewed  Discussion of management or test interpretation with external provider(s): Vital signs at the time of disposition were:  BP (!) 150/90   Pulse 70   Temp 98.2 °F (36.8 °C) (Oral)   Resp 19   Ht 6' 3" (1.905 m)   Wt 106.6 kg (235 lb)   SpO2 99%   BMI 29.37 kg/m²       See AVS for additional recommendations. Medications listed herein were prescribed after reviewing the patient's allergies, medication list, history, most recent laboratories as available.  Referrals below were provided after reviewing the patient's previous medical providers. He understands he  should return for any worsening or changes in condition.  Prior " to discharge the patient was asked if he  had any additional concerns or complaints and he declined. The patient was given an opportunity to ask questions and all were answered to his satisfaction.     Risk  Prescription drug management.  Diagnosis or treatment significantly limited by social determinants of health.               ED Course as of 12/03/24 1904   Tue Dec 03, 2024   1413 BP(!): 160/92 [VC]   1414 Temp: 98.2 °F (36.8 °C) [VC]   1414 Temp Source: Oral [VC]   1414 Pulse: 73 [VC]   1414 Resp: 17 [VC]   1414 SpO2: 100 % [VC]   1424 35yo male with ha, constant, 5-6/10, tylenol/ibuprofen helped a little, associated lh, most when lying down.  Tight band feeling.   [VC]   1437 Pt seen by LINDA Smith.   [VC]      ED Course User Index  [VC] Calvin Cain DNP             The patient was seen by  LINDA Smith , with my direct supervision.  The student performed interview and physical with my assistance. The student has contributed portions of this document.                  Clinical Impression:  Final diagnoses:  [G44.209] Tension headache (Primary)          ED Disposition Condition    Discharge Stable          ED Prescriptions       Medication Sig Dispense Start Date End Date Auth. Provider    butalbital-acetaminophen-caffeine -40 mg (FIORICET, ESGIC) -40 mg per tablet Take 1-2 tablets by mouth every 8 (eight) hours as needed for Headaches. 12 tablet 12/3/2024 -- Calvin Cain DNP          Follow-up Information       Follow up With Specialties Details Why Contact Info    Michael Waddell MD Neurology Schedule an appointment as soon as possible for a visit   [address]  [address]  [facility] [address]  [phone]               Calvin Cain DNP  12/03/24 1904

## (undated) DEVICE — GOWN POLY REINF BRTH SLV XL

## (undated) DEVICE — SUT MCRYL PLUS 3-0 PS2 27IN

## (undated) DEVICE — BANDAGE ESMARK ELASTIC ST 6X9

## (undated) DEVICE — STAPLER SKIN PROXIMATE WIDE

## (undated) DEVICE — RETRIEVER SUTURE HEWSON DISP

## (undated) DEVICE — SUT ETHILON 3-0 PS2 18 BLK

## (undated) DEVICE — Device

## (undated) DEVICE — DRAPE MOBILE C-ARM

## (undated) DEVICE — GAUZE SPONGE 4X4 12PLY

## (undated) DEVICE — POSITIONER HEAD DONUT 9IN FOAM

## (undated) DEVICE — BIT DRILL AO 2.6X135MM SCALED

## (undated) DEVICE — PAD ABD 8X10 STERILE

## (undated) DEVICE — FORCEP BIPOLAR

## (undated) DEVICE — TOWEL OR DISP STRL BLUE 4/PK

## (undated) DEVICE — SUT VICRYL 0 27 CT-2

## (undated) DEVICE — TOURNIQUET SB QC DP 34X4IN

## (undated) DEVICE — PIN FIXATION ANCHORAGE
Type: IMPLANTABLE DEVICE | Site: ANKLE | Status: NON-FUNCTIONAL
Removed: 2022-12-06

## (undated) DEVICE — SUT VICRYL PLUS 3-0 SH 18IN

## (undated) DEVICE — ALCOHOL 70% ISOP RUBBING 4OZ

## (undated) DEVICE — PAD CAST SPECIALIST STRL 4

## (undated) DEVICE — COVER LIGHT HANDLE 80/CA

## (undated) DEVICE — BANDAGE MATRIX HK LOOP 6IN 5YD

## (undated) DEVICE — COVER CAMERA OPERATING ROOM

## (undated) DEVICE — DRAPE U SPLIT SHEET 54X76IN

## (undated) DEVICE — PACK BASIC SETUP SC BR

## (undated) DEVICE — DRAPE C-ARMOR EQUIPMENT COVER

## (undated) DEVICE — BANDAGE ACE DOUBLE STER 6IN

## (undated) DEVICE — BNDG COFLEX FOAM LF2 ST 4X5YD

## (undated) DEVICE — ELECTRODE REM PLYHSV RETURN 9

## (undated) DEVICE — DRAPE T EXTRM SURG 121X128X90

## (undated) DEVICE — SUPPORT ULNA NERVE PROTECTOR

## (undated) DEVICE — DRAPE STERI U-SHAPED 47X51IN

## (undated) DEVICE — SUT 3-0 MONOCRYL PLUS PS-2

## (undated) DEVICE — MANIFOLD 4 PORT

## (undated) DEVICE — APPLICATOR CHLORAPREP ORN 26ML

## (undated) DEVICE — DISSECTOR SJ 3MMX7CM

## (undated) DEVICE — SUT ULTRA BLADE #2

## (undated) DEVICE — DRAPE THREE-QTR REINF 53X77IN

## (undated) DEVICE — SPONGE LAP 18X18 PREWASHED

## (undated) DEVICE — DRESSING XEROFORM FOIL PK 1X8

## (undated) DEVICE — PAD CAST SPECIALIST STRL 6